# Patient Record
Sex: FEMALE | Race: BLACK OR AFRICAN AMERICAN | Employment: FULL TIME | URBAN - METROPOLITAN AREA
[De-identification: names, ages, dates, MRNs, and addresses within clinical notes are randomized per-mention and may not be internally consistent; named-entity substitution may affect disease eponyms.]

---

## 2017-01-23 ENCOUNTER — HOSPITAL ENCOUNTER (OUTPATIENT)
Dept: MAMMOGRAPHY | Age: 42
Discharge: HOME | End: 2017-01-23
Attending: INTERNAL MEDICINE
Payer: COMMERCIAL

## 2017-01-23 DIAGNOSIS — R92.8 ABNORMAL MAMMOGRAM OF LEFT BREAST: ICD-10-CM

## 2017-01-23 PROCEDURE — 77062 BREAST TOMOSYNTHESIS BI: CPT

## 2017-01-23 NOTE — LETTER
1/26/2017 12:01 PM 
 
Ms. Mak Plan [address] [place] [address] [phone] Dear Aubree Plan: 
 
Please find your most recent results below. Resulted Orders [facility] 3D LINDA W MAMMO BI DX INCL CAD Narrative EXAM:  [facility] 3D LINDA W MAMMO BI DX INCL CAD. INDICATION: 6month followup left. COMPARISON: Prior mammograms 7/7/2016, 7/6/2016, and 11/30/2011. Karen Cherelle COMPOSITION: The breasts are heterogeneously dense, which may obscure small 
masses. TECHNIQUE: Routine CC and MLO views of each breast were performed with digital 
technique and interpreted with use of computer-aided detection. Additionally, 
tomosynthesis of both breasts in the CC and MLO projections was performed. FINDINGS: There is no persistent parenchymal asymmetry in the left breast. 
Benign-appearing small cyst density seen in the upper outer right breast. There 
is no mass, suspicious calcification, distortion, skin thickening, or nipple 
retraction. No significant change from the prior study. Impression IMPRESSION:  No mammographic evidence of malignancy. BI-RADS Assessment Category 2: Benign finding. RECOMMENDATION:  Routine screening mammogram in one year in the absence of new 
or suspicious clinical findings. RECOMMENDATIONS: 
None. Keep up the good work! Please call me if you have any questions: [phone] Sincerely, [facility] 1

## 2017-04-17 ENCOUNTER — HOSPITAL ENCOUNTER (EMERGENCY)
Age: 42
Discharge: STILL ADMITTED OR ADMINISTRATIVE | End: 2017-04-17
Attending: EMERGENCY MEDICINE

## 2017-04-18 ENCOUNTER — OFFICE VISIT (OUTPATIENT)
Dept: INTERNAL MEDICINE CLINIC | Age: 42
End: 2017-04-18

## 2017-04-18 VITALS
OXYGEN SATURATION: 98 % | RESPIRATION RATE: 20 BRPM | BODY MASS INDEX: 37.13 KG/M2 | HEIGHT: 68 IN | HEART RATE: 64 BPM | DIASTOLIC BLOOD PRESSURE: 89 MMHG | SYSTOLIC BLOOD PRESSURE: 132 MMHG | TEMPERATURE: 97.5 F | WEIGHT: 245 LBS

## 2017-04-18 DIAGNOSIS — R51.9 HEADACHE, UNSPECIFIED HEADACHE TYPE: Primary | ICD-10-CM

## 2017-04-18 NOTE — LETTER
4/21/2017 9:11 AM 
 
Ms. Jeyson Cueto [address] [place] [id number] Dear Jeyson Cueto: 
 
Please find your most recent results below. Resulted Orders CBC WITH AUTOMATED DIFF Result Value Ref Range WBC 4.2 3.4 - 10.8 x10E3/uL  
 RBC 4.59 3.77 - 5.28 x10E6/uL HGB 12.3 11.1 - 15.9 g/dL HCT 38.8 34.0 - 46.6 % MCV 85 79 - 97 fL  
 MCH 26.8 26.6 - 33.0 pg  
 MCHC 31.7 31.5 - 35.7 g/dL  
 RDW 17.2 (H) 12.3 - 15.4 % PLATELET 496 978 - 039 x10E3/uL NEUTROPHILS 45 % Lymphocytes 45 % MONOCYTES 7 % EOSINOPHILS 2 % BASOPHILS 1 %  
 ABS. NEUTROPHILS 1.9 1.4 - 7.0 x10E3/uL Abs Lymphocytes 1.9 0.7 - 3.1 x10E3/uL  
 ABS. MONOCYTES 0.3 0.1 - 0.9 x10E3/uL  
 ABS. EOSINOPHILS 0.1 0.0 - 0.4 x10E3/uL  
 ABS. BASOPHILS 0.0 0.0 - 0.2 x10E3/uL IMMATURE GRANULOCYTES 0 %  
 ABS. IMM. GRANS. 0.0 0.0 - 0.1 x10E3/uL Narrative Performed at:  [address]  [id number] : Linder Lanes MD, Phone:  [phone] METABOLIC PANEL, COMPREHENSIVE Result Value Ref Range Glucose 96 65 - 99 mg/dL BUN 11 6 - 24 mg/dL Creatinine 0.85 0.57 - 1.00 mg/dL GFR est non-AA 85 >59 mL/min/1.73 GFR est AA 98 >59 mL/min/1.73  
 BUN/Creatinine ratio 13 9 - 23 Sodium 141 134 - 144 mmol/L Potassium 4.3 3.5 - 5.2 mmol/L Chloride 101 96 - 106 mmol/L  
 CO2 25 18 - 29 mmol/L Calcium 9.2 8.7 - 10.2 mg/dL Protein, total 6.9 6.0 - 8.5 g/dL Albumin 4.1 3.5 - 5.5 g/dL GLOBULIN, TOTAL 2.8 1.5 - 4.5 g/dL A-G Ratio 1.5 1.2 - 2.2 Bilirubin, total 0.5 0.0 - 1.2 mg/dL Alk. phosphatase 64 39 - 117 IU/L  
 AST (SGOT) 20 0 - 40 IU/L  
 ALT (SGPT) 14 0 - 32 IU/L Narrative Performed at:  [address]  [id number] : Linder Lanes MD, Phone:  [phone] TSH 3RD GENERATION Result Value Ref Range TSH 1.190 0.450 - 4.500 uIU/mL Narrative Performed at:  [address]  [id number] : Karin Gallo MD, Phone:  [phone] RECOMMENDATIONS: 
 
  Stable. Please call me if you have any questions: [phone] Sincerely, Merly Clayton NP

## 2017-04-18 NOTE — PATIENT INSTRUCTIONS
Head or Face Pain in Children: Care Instructions  Your Care Instructions  Common causes of head or face pain are allergies, stress, and injuries. Other causes include tooth problems and sinus infections. Eating certain foods, such as chocolate or cheese, or drinking certain liquids, such as cola, can cause head pain for some children. If your child has mild head pain, he or she may not need treatment. It is important to watch your child's symptoms and talk to your doctor if the pain continues or gets worse. Follow-up care is a key part of your child's treatment and safety. Be sure to make and go to all appointments, and call your doctor if your child is having problems. It's also a good idea to know your child's test results and keep a list of the medicines your child takes. How can you care for your child at home? · Be safe with medicines. Give pain medicines exactly as directed. ¨ If the doctor gave your child a prescription medicine for pain, give it as prescribed. ¨ If your child is not taking a prescription pain medicine, ask your doctor if he or she can take an over-the-counter pain medicine. ¨ Do not give aspirin to anyone younger than 20. It has been linked to Reye syndrome, a serious illness. · Have your child take it easy for the next few days or longer if he or she is not feeling well. · Use a warm, moist towel to relax tight muscles in your child's shoulder and neck. Gently massage your child's neck and shoulders. · Put ice or a cold pack on the area for 10 to 20 minutes at a time. Put a thin cloth between the ice and your child's skin. When should you call for help? Call 911 anytime you think your child may need emergency care. For example, call if:  · Your child has twitching, jerking, or a seizure. · Your child passes out (loses consciousness). · Your child has general weakness, including new problems with walking or balance.   · Your child has a sudden, severe headache that is different from past headaches. Call your doctor now or seek immediate medical care if:  · Your child has a fever with a stiff neck or a severe headache. · Your child has nausea and vomiting. · Your child cannot keep food or liquids down. Watch closely for changes in your child's health, and be sure to contact your doctor if:  · Your child's head or face pain does not get better as expected. Where can you learn more? Go to http://gregorio-megan.info/. Enter B980 in the search box to learn more about \"Head or Face Pain in Children: Care Instructions. \"  Current as of: May 27, 2016  Content Version: 11.2  © 6497-4168 [employer]. Care instructions adapted under license by [employer] (which disclaims liability or warranty for this information). If you have questions about a medical condition or this instruction, always ask your healthcare professional. [place] [address] any warranty or liability for your use of this information.

## 2017-04-18 NOTE — MR AVS SNAPSHOT
Visit Information Date & Time Provider Department Dept. Phone Encounter #  
 4/18/2017  8:30 AM Max Gomez, [facility] [phone] [id number] Upcoming Health Maintenance Date Due DTaP/Tdap/Td series (1 - Tdap) 9/4/1996 PAP AKA CERVICAL CYTOLOGY 6/19/2017 Allergies as of 4/18/2017  Review Complete On: 4/18/2017 By: Max Gomez NP No Known Allergies Current Immunizations  Never Reviewed No immunizations on file. Not reviewed this visit You Were Diagnosed With   
  
 Codes Comments Headache, unspecified headache type    -  Primary ICD-10-CM: R51 ICD-9-CM: 903. 0 Vitals BP Pulse Temp Resp Height(growth percentile) Weight(growth percentile) 132/89 64 97.5 °F (36.4 °C) 20 5' 8\" (1.727 m) 245 lb (111.1 kg) LMP SpO2 BMI OB Status Smoking Status 11/16/2016 98% 37.25 kg/m2 Hysterectomy Never Smoker BMI and BSA Data Body Mass Index Body Surface Area  
 37.25 kg/m 2 2.31 m 2 Preferred Pharmacy Pharmacy Name Phone [id number] [place], [address] [facility] [phone] Your Updated Medication List  
  
   
This list is accurate as of: 4/18/17  9:28 AM.  Always use your most recent med list.  
  
  
  
  
 MULTI-VITAMIN PO Take  by mouth. SLOW REL IRON PO Take  by mouth. We Performed the Following CBC WITH AUTOMATED DIFF [47080 CPT(R)] METABOLIC PANEL, COMPREHENSIVE [50304 CPT(R)] TSH 3RD GENERATION [00683 CPT(R)] Patient Instructions Head or Face Pain in Children: Care Instructions Your Care Instructions Common causes of head or face pain are allergies, stress, and injuries. Other causes include tooth problems and sinus infections. Eating certain foods, such as chocolate or cheese, or drinking certain liquids, such as cola, can cause head pain for some children. If your child has mild head pain, he or she may not need treatment. It is important to watch your child's symptoms and talk to your doctor if the pain continues or gets worse. Follow-up care is a key part of your child's treatment and safety. Be sure to make and go to all appointments, and call your doctor if your child is having problems. It's also a good idea to know your child's test results and keep a list of the medicines your child takes. How can you care for your child at home? · Be safe with medicines. Give pain medicines exactly as directed. ¨ If the doctor gave your child a prescription medicine for pain, give it as prescribed. ¨ If your child is not taking a prescription pain medicine, ask your doctor if he or she can take an over-the-counter pain medicine. ¨ Do not give aspirin to anyone younger than 20. It has been linked to Reye syndrome, a serious illness. · Have your child take it easy for the next few days or longer if he or she is not feeling well. · Use a warm, moist towel to relax tight muscles in your child's shoulder and neck. Gently massage your child's neck and shoulders. · Put ice or a cold pack on the area for 10 to 20 minutes at a time. Put a thin cloth between the ice and your child's skin. When should you call for help? Call 911 anytime you think your child may need emergency care. For example, call if: 
· Your child has twitching, jerking, or a seizure. · Your child passes out (loses consciousness). · Your child has general weakness, including new problems with walking or balance. · Your child has a sudden, severe headache that is different from past headaches. Call your doctor now or seek immediate medical care if: 
· Your child has a fever with a stiff neck or a severe headache. · Your child has nausea and vomiting. · Your child cannot keep food or liquids down. Watch closely for changes in your child's health, and be sure to contact your doctor if: · Your child's head or face pain does not get better as expected. Where can you learn more? Go to http://gregorio-megan.info/. Enter U029 in the search box to learn more about \"Head or Face Pain in Children: Care Instructions. \" Current as of: May 27, 2016 Content Version: 11.2 © 6582-6395 [employer]. Care instructions adapted under license by [employer] (which disclaims liability or warranty for this information). If you have questions about a medical condition or this instruction, always ask your healthcare professional. [address] any warranty or liability for your use of this information. Please provide this summary of care documentation to your next provider. Your primary care clinician is listed as Kurt Harden. If you have any questions after today's visit, please call [phone].

## 2017-04-18 NOTE — LETTER
4/21/2017 1:02 PM 
 
Ms. Zena Haney [address] [place] [id number] Dear Zena Haney: 
 
Please find your most recent results below. Resulted Orders CBC WITH AUTOMATED DIFF Result Value Ref Range WBC 4.2 3.4 - 10.8 x10E3/uL  
 RBC 4.59 3.77 - 5.28 x10E6/uL HGB 12.3 11.1 - 15.9 g/dL HCT 38.8 34.0 - 46.6 % MCV 85 79 - 97 fL  
 MCH 26.8 26.6 - 33.0 pg  
 MCHC 31.7 31.5 - 35.7 g/dL  
 RDW 17.2 (H) 12.3 - 15.4 % PLATELET 760 972 - 797 x10E3/uL NEUTROPHILS 45 % Lymphocytes 45 % MONOCYTES 7 % EOSINOPHILS 2 % BASOPHILS 1 %  
 ABS. NEUTROPHILS 1.9 1.4 - 7.0 x10E3/uL Abs Lymphocytes 1.9 0.7 - 3.1 x10E3/uL  
 ABS. MONOCYTES 0.3 0.1 - 0.9 x10E3/uL  
 ABS. EOSINOPHILS 0.1 0.0 - 0.4 x10E3/uL  
 ABS. BASOPHILS 0.0 0.0 - 0.2 x10E3/uL IMMATURE GRANULOCYTES 0 %  
 ABS. IMM. GRANS. 0.0 0.0 - 0.1 x10E3/uL Narrative Performed at:  [address]  [id number] : Kimberley Doherty MD, Phone:  [phone] METABOLIC PANEL, COMPREHENSIVE Result Value Ref Range Glucose 96 65 - 99 mg/dL BUN 11 6 - 24 mg/dL Creatinine 0.85 0.57 - 1.00 mg/dL GFR est non-AA 85 >59 mL/min/1.73 GFR est AA 98 >59 mL/min/1.73  
 BUN/Creatinine ratio 13 9 - 23 Sodium 141 134 - 144 mmol/L Potassium 4.3 3.5 - 5.2 mmol/L Chloride 101 96 - 106 mmol/L  
 CO2 25 18 - 29 mmol/L Calcium 9.2 8.7 - 10.2 mg/dL Protein, total 6.9 6.0 - 8.5 g/dL Albumin 4.1 3.5 - 5.5 g/dL GLOBULIN, TOTAL 2.8 1.5 - 4.5 g/dL A-G Ratio 1.5 1.2 - 2.2 Bilirubin, total 0.5 0.0 - 1.2 mg/dL Alk. phosphatase 64 39 - 117 IU/L  
 AST (SGOT) 20 0 - 40 IU/L  
 ALT (SGPT) 14 0 - 32 IU/L Narrative Performed at:  [address]  [id number] : Kimberley Doherty MD, Phone:  [phone] TSH 3RD GENERATION Result Value Ref Range TSH 1.190 0.450 - 4.500 uIU/mL Narrative Performed at:  [address], [address] [place] [address]  [id number] : Yas Costa MD, Phone:  [phone] RECOMMENDATIONS: 
 
[facility] labs Please call me if you have any questions: [phone] Sincerely, Brendon Zafar, NP

## 2017-04-18 NOTE — PROGRESS NOTES
Chief Complaint   Patient presents with    Headache     H/A with working out--pain scale 10/10     Reviewed record  In preparation for visit and have obtained necessary documentation. 1. Have you been to the ER, urgent care clinic since your last visit? Hospitalized since your last visit? Hysterectomy Nov. 2016.    2. Have you seen or consulted any other health care providers outside of the [facility] since your last visit? Include any pap smears or colon screening.    Dr. Melissa Pemberton    Used 2 patient I. D. 's

## 2017-04-18 NOTE — PROGRESS NOTES
HISTORY OF PRESENT ILLNESS  Gladis Gordon is a 39 y.o. female. This is a patient of Dr. Yandel Huynh who presents today related to headache. The patient states that over the last 2 weeks she has had intermittent experience of headaches. Headaches have occurred 4 times over the last 2 weeks, with activity. The patient describes sharp, severe pain to front of head, which she rates as 10/10 that lasts for about one minute. Patient stops activity and relaxes and states she experiences mild nagging pain to above the right eye for about 1 hour. The patient denies nausea, vomiting, vision change, and speech change. She had no numbness, tingling, or weakness. Patient checked her BP yesterday evening following activity and found top number to be 160, she rechecked letter after rest and BP was 143/99. Visit Vitals    /89    Pulse 64    Temp 97.5 °F (36.4 °C)    Resp 20    Ht 5' 8\" (1.727 m)    Wt 245 lb (111.1 kg)    SpO2 98%    BMI 37.25 kg/m2     HPI    Review of Systems   Constitutional: Negative for chills, fever, malaise/fatigue and weight loss. HENT: Positive for congestion. Negative for ear pain and sore throat. Eyes: Negative for blurred vision. Respiratory: Negative for cough, shortness of breath and wheezing. Cardiovascular: Negative for chest pain, palpitations and leg swelling. Gastrointestinal: Negative for abdominal pain. Genitourinary: Negative. Musculoskeletal: Negative. Skin: Negative. Neurological: Positive for headaches. Negative for dizziness, tingling, tremors and weakness. Endo/Heme/Allergies: Negative. Psychiatric/Behavioral: Negative. Physical Exam   Constitutional: She is oriented to person, place, and time. She appears well-developed and well-nourished. No distress. HENT:   Head: Normocephalic and atraumatic. Right Ear: External ear normal.   Left Ear: External ear normal.   Mouth/Throat: No oropharyngeal exudate.    Right frontal sinus tenderness   Eyes: Conjunctivae are normal.   Neck: Neck supple. Cardiovascular: Normal rate, regular rhythm, normal heart sounds and intact distal pulses. No murmur heard. Pulmonary/Chest: Effort normal and breath sounds normal. No respiratory distress. She has no wheezes. She has no rales. Abdominal: Soft. She exhibits no distension. Musculoskeletal: Normal range of motion. She exhibits no edema. Lymphadenopathy:     She has no cervical adenopathy. Neurological: She is alert and oriented to person, place, and time. No cranial nerve deficit. She exhibits normal muscle tone. Coordination normal.   Skin: Skin is warm and dry. Psychiatric: She has a normal mood and affect. Her behavior is normal.   Nursing note and vitals reviewed. ASSESSMENT and PLAN    ICD-10-CM ICD-9-CM    1. Headache, unspecified headache type R51 784.0 Advised to increase fluid intake, as tolerated. Monitor BP and keep log at home. May take OTC Zyrtec as per packaging instructions daily x 10 days. Recommend eye exam.  Discussed possibility of exertional headache. Will order  CBC WITH AUTOMATED DIFF      METABOLIC PANEL, COMPREHENSIVE      TSH 3RD GENERATION     Lab results and schedule of future lab studies reviewed with patient  Reviewed diet, exercise and weight control  Reviewed medications and side effects in detail  Patient encouraged to call or return to office if symptoms do not improve or worsen. Reviewed plan of care with patient who acknowledges understanding and agrees. If experiencing severe head pain and/or neurological changes such as slurred speech, vision change, or extremity weakness, present to ER. Follow-up in two weeks, or sooner as needed.

## 2017-04-19 LAB
ALBUMIN SERPL-MCNC: 4.1 G/DL (ref 3.5–5.5)
ALBUMIN/GLOB SERPL: 1.5 {RATIO} (ref 1.2–2.2)
ALP SERPL-CCNC: 64 IU/L (ref 39–117)
ALT SERPL-CCNC: 14 IU/L (ref 0–32)
AST SERPL-CCNC: 20 IU/L (ref 0–40)
BASOPHILS # BLD AUTO: 0 X10E3/UL (ref 0–0.2)
BASOPHILS NFR BLD AUTO: 1 %
BILIRUB SERPL-MCNC: 0.5 MG/DL (ref 0–1.2)
BUN SERPL-MCNC: 11 MG/DL (ref 6–24)
BUN/CREAT SERPL: 13 (ref 9–23)
CALCIUM SERPL-MCNC: 9.2 MG/DL (ref 8.7–10.2)
CHLORIDE SERPL-SCNC: 101 MMOL/L (ref 96–106)
CO2 SERPL-SCNC: 25 MMOL/L (ref 18–29)
CREAT SERPL-MCNC: 0.85 MG/DL (ref 0.57–1)
EOSINOPHIL # BLD AUTO: 0.1 X10E3/UL (ref 0–0.4)
EOSINOPHIL NFR BLD AUTO: 2 %
ERYTHROCYTE [DISTWIDTH] IN BLOOD BY AUTOMATED COUNT: 17.2 % (ref 12.3–15.4)
GLOBULIN SER CALC-MCNC: 2.8 G/DL (ref 1.5–4.5)
GLUCOSE SERPL-MCNC: 96 MG/DL (ref 65–99)
HCT VFR BLD AUTO: 38.8 % (ref 34–46.6)
HGB BLD-MCNC: 12.3 G/DL (ref 11.1–15.9)
IMM GRANULOCYTES # BLD: 0 X10E3/UL (ref 0–0.1)
IMM GRANULOCYTES NFR BLD: 0 %
LYMPHOCYTES # BLD AUTO: 1.9 X10E3/UL (ref 0.7–3.1)
LYMPHOCYTES NFR BLD AUTO: 45 %
MCH RBC QN AUTO: 26.8 PG (ref 26.6–33)
MCHC RBC AUTO-ENTMCNC: 31.7 G/DL (ref 31.5–35.7)
MCV RBC AUTO: 85 FL (ref 79–97)
MONOCYTES # BLD AUTO: 0.3 X10E3/UL (ref 0.1–0.9)
MONOCYTES NFR BLD AUTO: 7 %
NEUTROPHILS # BLD AUTO: 1.9 X10E3/UL (ref 1.4–7)
NEUTROPHILS NFR BLD AUTO: 45 %
PLATELET # BLD AUTO: 261 X10E3/UL (ref 150–379)
POTASSIUM SERPL-SCNC: 4.3 MMOL/L (ref 3.5–5.2)
PROT SERPL-MCNC: 6.9 G/DL (ref 6–8.5)
RBC # BLD AUTO: 4.59 X10E6/UL (ref 3.77–5.28)
SODIUM SERPL-SCNC: 141 MMOL/L (ref 134–144)
TSH SERPL DL<=0.005 MIU/L-ACNC: 1.19 UIU/ML (ref 0.45–4.5)
WBC # BLD AUTO: 4.2 X10E3/UL (ref 3.4–10.8)

## 2018-01-19 ENCOUNTER — HOSPITAL ENCOUNTER (OUTPATIENT)
Dept: LAB | Age: 43
Discharge: HOME | End: 2018-01-19

## 2018-01-19 ENCOUNTER — HOSPITAL ENCOUNTER (EMERGENCY)
Age: 43
Discharge: HOME | End: 2018-01-19
Attending: FAMILY MEDICINE

## 2018-01-19 VITALS
OXYGEN SATURATION: 97 % | DIASTOLIC BLOOD PRESSURE: 87 MMHG | RESPIRATION RATE: 18 BRPM | HEIGHT: 68 IN | SYSTOLIC BLOOD PRESSURE: 131 MMHG | BODY MASS INDEX: 37.74 KG/M2 | HEART RATE: 86 BPM | WEIGHT: 249 LBS | TEMPERATURE: 98.5 F

## 2018-01-19 DIAGNOSIS — R68.89 FLU-LIKE SYMPTOMS: Primary | ICD-10-CM

## 2018-01-19 LAB
INFLUENZA A AG (POC): NORMAL
INFLUENZA AG (POC) NEGATIVE CTRL.: NORMAL
INFLUENZA AG (POC) POSITIVE CTRL.: NORMAL
INFLUENZA B AG (POC): NORMAL
LOT NUMBER POC: NORMAL
S PYO AG THROAT QL: NEGATIVE

## 2018-01-19 PROCEDURE — 87070 CULTURE OTHR SPECIMN AEROBIC: CPT | Performed by: PHYSICIAN ASSISTANT

## 2018-01-19 RX ORDER — OSELTAMIVIR PHOSPHATE 75 MG/1
75 CAPSULE ORAL 2 TIMES DAILY
Qty: 10 CAP | Refills: 0 | Status: SHIPPED | OUTPATIENT
Start: 2018-01-19 | End: 2018-01-24

## 2018-01-19 NOTE — UC PROVIDER NOTE
Patient is a 43 y.o. female presenting with cold symptoms. The history is provided by the patient. Cold Symptoms    This is a new problem. Episode onset: 3 days ago. The problem has been gradually worsening. Patient reports a subjective fever - was not measured. Associated symptoms include congestion, rhinorrhea, sore throat, swollen glands and cough. Pertinent negatives include no chest pain, no nausea, no vomiting, no ear pain, no headaches and no wheezing. No past medical history on file. Past Surgical History:   Procedure Laterality Date    ABDOMEN SURGERY PROC UNLISTED      tummytac    HX HYSTERECTOMY  11/16/2016         Family History   Problem Relation Age of Onset    Heart Failure Maternal Grandmother         Social History     Social History    Marital status: SINGLE     Spouse name: N/A    Number of children: N/A    Years of education: N/A     Occupational History    Not on file. Social History Main Topics    Smoking status: Never Smoker    Smokeless tobacco: Never Used    Alcohol use Yes      Comment: occassionally--maybe once or twice per yr.  Drug use: No    Sexual activity: Yes     Partners: Male      Comment:  has 3 children ages 6 yrs, 9rs. & 6 yrs. Other Topics Concern    Not on file     Social History Narrative                ALLERGIES: Review of patient's allergies indicates no known allergies. Review of Systems   Constitutional: Positive for chills and fever. HENT: Positive for congestion, rhinorrhea and sore throat. Negative for ear pain. Respiratory: Positive for cough. Negative for shortness of breath and wheezing. Cardiovascular: Negative for chest pain and palpitations. Gastrointestinal: Negative for nausea and vomiting. Musculoskeletal: Positive for myalgias. Neurological: Negative for headaches.        Vitals:    01/19/18 1700 01/19/18 1756   BP: (!) 148/91 131/87   Pulse: 86    Resp: 18    Temp: 98.5 °F (36.9 °C)    SpO2: 97% Weight: 112.9 kg (249 lb)    Height: 5' 8\" (1.727 m)        Physical Exam   Constitutional: She appears well-developed and well-nourished. No distress. HENT:   Right Ear: Tympanic membrane, external ear and ear canal normal.   Left Ear: Tympanic membrane, external ear and ear canal normal.   Nose: Rhinorrhea present. Right sinus exhibits no maxillary sinus tenderness and no frontal sinus tenderness. Left sinus exhibits no maxillary sinus tenderness and no frontal sinus tenderness. Mouth/Throat: Mucous membranes are normal. Posterior oropharyngeal erythema present. No oropharyngeal exudate, posterior oropharyngeal edema or tonsillar abscesses. Cardiovascular: Normal rate, regular rhythm and normal heart sounds. Pulmonary/Chest: Effort normal and breath sounds normal. No respiratory distress. She has no wheezes. She has no rales. Neurological: She is alert. Skin: She is not diaphoretic. Psychiatric: She has a normal mood and affect. Her behavior is normal. Judgment and thought content normal.   Nursing note and vitals reviewed. MDM     Differential Diagnosis; Clinical Impression; Plan:     CLINICAL IMPRESSION:  Flu-like symptoms  (primary encounter diagnosis)    Plan:  1. Throat Cx  2. Tamiflu  3. Fluids, rest  Amount and/or Complexity of Data Reviewed:   Clinical lab tests:  Ordered and reviewed (RST: negative  Influenza: negative)  Risk of Significant Complications, Morbidity, and/or Mortality:   Presenting problems: Moderate  Diagnostic procedures: Moderate  Management options:   Moderate  Progress:   Patient progress:  Stable      Procedures

## 2018-01-19 NOTE — DISCHARGE INSTRUCTIONS

## 2018-01-21 LAB
BACTERIA SPEC CULT: NORMAL
SERVICE CMNT-IMP: NORMAL

## 2018-09-19 ENCOUNTER — HOSPITAL ENCOUNTER (OUTPATIENT)
Dept: MAMMOGRAPHY | Age: 43
Discharge: HOME | End: 2018-09-19
Attending: INTERNAL MEDICINE
Payer: COMMERCIAL

## 2018-09-19 ENCOUNTER — OFFICE VISIT (OUTPATIENT)
Dept: INTERNAL MEDICINE CLINIC | Age: 43
End: 2018-09-19

## 2018-09-19 VITALS
RESPIRATION RATE: 15 BRPM | HEART RATE: 62 BPM | TEMPERATURE: 97.5 F | DIASTOLIC BLOOD PRESSURE: 88 MMHG | HEIGHT: 68 IN | WEIGHT: 253 LBS | SYSTOLIC BLOOD PRESSURE: 130 MMHG | BODY MASS INDEX: 38.34 KG/M2 | OXYGEN SATURATION: 97 %

## 2018-09-19 DIAGNOSIS — Z12.31 VISIT FOR SCREENING MAMMOGRAM: ICD-10-CM

## 2018-09-19 DIAGNOSIS — Z23 ENCOUNTER FOR IMMUNIZATION: ICD-10-CM

## 2018-09-19 DIAGNOSIS — Z20.2 EXPOSURE TO STD: ICD-10-CM

## 2018-09-19 DIAGNOSIS — E55.9 VITAMIN D DEFICIENCY: ICD-10-CM

## 2018-09-19 DIAGNOSIS — E66.9 OBESITY (BMI 30-39.9): ICD-10-CM

## 2018-09-19 DIAGNOSIS — R73.03 PRE-DIABETES: ICD-10-CM

## 2018-09-19 DIAGNOSIS — Z00.00 ROUTINE GENERAL MEDICAL EXAMINATION AT A HEALTH CARE FACILITY: Primary | ICD-10-CM

## 2018-09-19 PROCEDURE — 77063 BREAST TOMOSYNTHESIS BI: CPT

## 2018-09-19 NOTE — PATIENT INSTRUCTIONS

## 2018-09-19 NOTE — PROGRESS NOTES
HISTORY OF PRESENT ILLNESS Halle Baca is a 37 y.o. female here for follow up. Here for complete physical. 
She has gained more weight. Watching diet and exercise. Doing more weightlifting. She has pre diabetes. watching diet. Her left great toe does not extend a lot. She is to wear high heels. No fall or injury. No chest pain ,or palpitation. Would like to get STD checkup. Need tetanus shot. Just had mammogram.  She she will see GYN soon for Pap smear. Complete Physical  
 
Toe Pain Pertinent negatives include no neck pain. Ear Fullness Pertinent negatives include no neck pain. Follow-up Obesity Review of Systems Constitutional: Negative. Negative for chills and fever. HENT: Negative. Eyes: Negative. Negative for blurred vision and double vision. Respiratory: Negative. Cardiovascular: Negative. Gastrointestinal: Negative. Negative for heartburn and nausea. Genitourinary: Negative. Negative for dysuria and urgency. Musculoskeletal: Negative. Negative for myalgias and neck pain. Skin: Negative. All other systems reviewed and are negative. Physical Exam  
Constitutional: She is oriented to person, place, and time. She appears well-developed and well-nourished. No distress. HENT:  
Head: Normocephalic and atraumatic. Right Ear: External ear normal.  
Left Ear: External ear normal.  
Nose: Nose normal.  
Mouth/Throat: Oropharynx is clear and moist. No oropharyngeal exudate. Eyes: Conjunctivae and EOM are normal. Pupils are equal, round, and reactive to light. Neck: Normal range of motion. Neck supple. No JVD present. No thyromegaly present. Cardiovascular: Normal rate, regular rhythm, normal heart sounds and intact distal pulses. Pulmonary/Chest: Effort normal and breath sounds normal. No respiratory distress. She has no wheezes. She has no rales. Abdominal: Soft. Bowel sounds are normal. She exhibits no distension. There is no tenderness. Musculoskeletal: She exhibits no edema or tenderness. Neurological: She is alert and oriented to person, place, and time. She has normal reflexes. She displays normal reflexes. No cranial nerve deficit. She exhibits normal muscle tone. Coordination normal.  
Psychiatric: She has a normal mood and affect. Her behavior is normal.  
 
 
ASSESSMENT and PLAN Diagnoses and all orders for this visit: 1. Routine general medical examination at a health care facility Seems healthy. Advised to eat healthy and exercise and lose weight. We will check, 
 
-     CBC WITH AUTOMATED DIFF 
-     METABOLIC PANEL, COMPREHENSIVE 
-     LIPID PANEL 
-     HEMOGLOBIN A1C WITH EAG 
-     TSH 3RD GENERATION 
-     URINALYSIS W/ RFLX MICROSCOPIC 2. Pre-diabetes Eating healthier- a Mediterranean style diet with 55% or less of calories from carbohydrates has been shown to be very helpful for people with pre-diabetes. Try to eat more vegetables, whole fruit, nuts, whole grains, yogurt and less refined grains. Eat less red meat. Chicken and fish would be good protein sources. Aim to eat less than 45 grams of carbohydrates per meal. [employer] has a nice review.  
 
-     HEMOGLOBIN A1C WITH EAG 3. Obesity (BMI 30-39. 9) Gained more weight. 1. Consume 3 meals per day, do not skip meals. 2. Chose low fat, portion controlled foods for snack and desserts such as low fat chocolate pudding, low fat flavored yogurt, 100 calorie snack packs, etc.  
3. Increase moderate intensity exercise to 30 minutes at least 5 times per week. 4. Read nutrition facts labels to identify foods that are lean, extra lean and low fat The patient is asked to make an attempt to improve diet and exercise patterns to aid in medical management of this problem. -     HEMOGLOBIN A1C WITH EAG 4. Encounter for immunization We will give, 
 
-     TETANUS, DIPHTHERIA TOXOIDS AND ACELLULAR PERTUSSIS VACCINE (TDAP), IN INDIVIDS. >=7, IM 
 
5. Exposure to STD 
-     HIV 2 AB W/REFL IB 
-     HSV TYPE 2-SPECIFIC ABS, IGG W/REFL SUPPLEMENTAL TESTING 
-     RPR 
-     HEPATITIS C AB 
-     HEP B SURFACE AG 
 
6. Vitamin D deficiency 
-     VITAMIN D, 25 HYDROXY Discussed expected course/resolution/complications of diagnosis in detail with patient. Medication risks/benefits/costs/interactions/alternatives discussed with patient. Pt was given an after visit summary which includes diagnoses, current medications & vitals. Pt expressed understanding with the diagnosis and plan.

## 2018-09-19 NOTE — MR AVS SNAPSHOT
[address] [facility] 102 1400 [address] [place] [address] 
[phone] Patient: Uche Faust MRN: [id number] UZS:[birth year]4 Visit Information Date & Time Provider Department Dept. Phone Encounter #  
 9/19/2018  9:30 AM Isha Melendez MD [facility] Internal Medicine [phone] [id number] Upcoming Health Maintenance Date Due DTaP/Tdap/Td series (1 - Tdap) 9/4/1996 PAP AKA CERVICAL CYTOLOGY 6/19/2017 Influenza Age 5 to Adult 4/1/2019* *Topic was postponed. The date shown is not the original due date. Allergies as of 9/19/2018  Review Complete On: 9/19/2018 By: Isha Melendez MD  
 No Known Allergies Current Immunizations  Reviewed on 9/19/2018 Name Date Tdap  Incomplete Reviewed by Isha Melendez MD on 9/19/2018 at  9:52 AM  
You Were Diagnosed With   
  
 Codes Comments Routine general medical examination at a health care facility    -  Primary ICD-10-CM: Z00.00 ICD-9-CM: V70.0 Pre-diabetes     ICD-10-CM: R73.03 
ICD-9-CM: 790.29 Obesity (BMI 30-39. 9)     ICD-10-CM: E66.9 ICD-9-CM: 278.00 Encounter for immunization     ICD-10-CM: R30 ICD-9-CM: V03.89 Exposure to STD     ICD-10-CM: Z20.2 ICD-9-CM: V01.6 Vitamin D deficiency     ICD-10-CM: E55.9 ICD-9-CM: 268.9 Vitals BP Pulse Temp Resp Height(growth percentile) Weight(growth percentile) 130/88 (BP 1 Location: Right arm, BP Patient Position: Sitting) 62 97.5 °F (36.4 °C) (Oral) 15 5' 8\" (1.727 m) 253 lb (114.8 kg) LMP SpO2 BMI OB Status Smoking Status 11/16/2016 97% 38.47 kg/m2 Hysterectomy Never Smoker Vitals History BMI and BSA Data Body Mass Index Body Surface Area  
 38.47 kg/m 2 2.35 m 2 Preferred Pharmacy Pharmacy Name Phone [facility] [address] [place] [address] [address]  [facility] [phone] Your Updated Medication List  
  
   
 This list is accurate as of 9/19/18  9:55 AM.  Always use your most recent med list.  
  
  
  
  
 MULTI-VITAMIN PO Take  by mouth. We Performed the Following CBC WITH AUTOMATED DIFF [61733 CPT(R)] HEMOGLOBIN A1C WITH EAG [96328 CPT(R)] HEP B SURFACE AG [id number] CPT(R)] HEPATITIS C AB [31966 CPT(R)] HIV 2 AB W/REFL IB [17688 CPT(R)] HSV TYPE 2-SPECIFIC ABS, IGG W/REFL SUPPLEMENTAL TESTING [63438 CPT(R)] LIPID PANEL [75460 CPT(R)] METABOLIC PANEL, COMPREHENSIVE [29298 CPT(R)] RPR [54939 CPT(R)] TETANUS, DIPHTHERIA TOXOIDS AND ACELLULAR PERTUSSIS VACCINE (TDAP), IN INDIVIDS. >=7, IM [id number] CPT(R)] TSH 3RD GENERATION [71612 CPT(R)] URINALYSIS W/ RFLX MICROSCOPIC [20070 CPT(R)] VITAMIN D, 25 HYDROXY [id number] CPT(R)] Patient Instructions Prediabetes: Care Instructions Your Care Instructions Prediabetes is a warning sign that you are at risk for getting type 2 diabetes. It means that your blood sugar is higher than it should be. The food you eat turns into sugar, which your body uses for energy. Normally, an organ called the pancreas makes insulin, which allows the sugar in your blood to get into your body's cells. But when your body can't use insulin the right way, the sugar doesn't move into cells. It stays in your blood instead. This is called insulin resistance. The buildup of sugar in the blood causes prediabetes. The good news is that lifestyle changes may help you get your blood sugar back to normal and help you avoid or delay diabetes. Follow-up care is a key part of your treatment and safety. Be sure to make and go to all appointments, and call your doctor if you are having problems. It's also a good idea to know your test results and keep a list of the medicines you take. How can you care for yourself at home? · Watch your weight. A healthy weight helps your body use insulin properly. · Limit the amount of calories, sweets, and unhealthy fat you eat. Ask your doctor if you should see a dietitian. A registered dietitian can help you create meal plans that fit your lifestyle. · Get at least 30 minutes of exercise on most days of the week. Exercise helps control your blood sugar. It also helps you maintain a healthy weight. Walking is a good choice. You also may want to do other activities, such as running, swimming, cycling, or playing tennis or team sports. · Do not smoke. Smoking can make prediabetes worse. If you need help quitting, talk to your doctor about stop-smoking programs and medicines. These can increase your chances of quitting for good. · If your doctor prescribed medicines, take them exactly as prescribed. Call your doctor if you think you are having a problem with your medicine. You will get more details on the specific medicines your doctor prescribes. When should you call for help? Watch closely for changes in your health, and be sure to contact your doctor if: 
  · You have any symptoms of diabetes. These may include: ¨ Being thirsty more often. ¨ Urinating more. ¨ Being hungrier. ¨ Losing weight. ¨ Being very tired. ¨ Having blurry vision.  
  · You have a wound that will not heal.  
  · You have an infection that will not go away.  
  · You have problems with your blood pressure.  
  · You want more information about diabetes and how you can keep from getting it. Where can you learn more? Go to http://gregorio-megan.info/. Enter I222 in the search box to learn more about \"Prediabetes: Care Instructions. \" Current as of: December 7, 2017 Content Version: 11.7 © 2933-2653 Healthwise, Incorporated. Care instructions adapted under license by [employer] (which disclaims liability or warranty for this information).  If you have questions about a medical condition or this instruction, always ask your healthcare professional. S[employer]na Lan[employer] disclaims any warranty or liability for your use of this information. Introducing [facility]! [employer] introduces Aplicor patient portal. Now you can access parts of your medical record, email your doctor's office, and request medication refills online. 1. In your internet browser, go to [email or web address] 2. Click on the First Time User? Click Here link in the Sign In box. You will see the New Member Sign Up page. 3. Enter your Aplicor Access Code exactly as it appears below. You will not need to use this code after youve completed the sign-up process. If you do not sign up before the expiration date, you must request a new code. · Aplicor Access Code: [email or web address] Expires: 11/19/2018  3:54 PM 
 
4. Enter the last four digits of your Social Security Number (xxxx) and Date of Birth (mm/dd/yyyy) as indicated and click Submit. You will be taken to the next sign-up page. 5. Create a Aplicor ID. This will be your Aplicor login ID and cannot be changed, so think of one that is secure and easy to remember. 6. Create a Aplicor password. You can change your password at any time. 7. Enter your Password Reset Question and Answer. This can be used at a later time if you forget your password. 8. Enter your e-mail address. You will receive e-mail notification when new information is available in [address] 9. Click Sign Up. You can now view and download portions of your medical record. 10. Click the Download Summary menu link to download a portable copy of your medical information. If you have questions, please visit the Frequently Asked Questions section of the Aplicor website. Remember, Aplicor is NOT to be used for urgent needs. For medical emergencies, dial 911. Now available from your iPhone and Android! Please provide this summary of care documentation to your next provider. Your primary care clinician is listed as Christa Lipscomb. If you have any questions after today's visit, please call [phone].

## 2018-09-19 NOTE — PROGRESS NOTES
Health Maintenance Due Topic Date Due  
 DTaP/Tdap/Td series (1 - Tdap) 09/04/1996  PAP AKA CERVICAL CYTOLOGY  06/19/2017  Influenza Age 5 to Adult  08/01/2018 Chief Complaint Patient presents with  Complete Physical  
 
 
1. Have you been to the ER, urgent care clinic since your last visit? Hospitalized since your last visit? No 
 
2. Have you seen or consulted any other health care providers outside of the [address] [place] [address] since your last visit? Include any pap smears or colon screening. No 
 
3) Do you have an Advance Directive on file? no 
 
4) Are you interested in receiving information on Advance Directives? NO Patient is accompanied by self I have received verbal consent from Halle Baca to discuss any/all medical information while they are present in the room. Halle Baca is a 37 y.o. female  who presents for routine immunizations. She denies any symptoms , reactions or allergies that would exclude them from being immunized today. Risks and adverse reactions were discussed and the VIS was given to them. All questions were addressed. She was observed for 10 min post injection. There were no reactions observed.  
 
Mariel Fisher LPN

## 2018-09-20 NOTE — PROGRESS NOTES
Called the pt and advised her that her mammogram is WNL and to follow up as directed. The pt would like a copy of her mammogram results mailed to her.

## 2018-09-24 LAB
25(OH)D3+25(OH)D2 SERPL-MCNC: 14 NG/ML (ref 30–100)
ALBUMIN SERPL-MCNC: 4.4 G/DL (ref 3.5–5.5)
ALBUMIN/GLOB SERPL: 1.5 {RATIO} (ref 1.2–2.2)
ALP SERPL-CCNC: 74 IU/L (ref 39–117)
ALT SERPL-CCNC: 16 IU/L (ref 0–32)
APPEARANCE UR: CLEAR
AST SERPL-CCNC: 20 IU/L (ref 0–40)
BASOPHILS # BLD AUTO: 0 X10E3/UL (ref 0–0.2)
BASOPHILS NFR BLD AUTO: 1 %
BILIRUB SERPL-MCNC: 0.6 MG/DL (ref 0–1.2)
BILIRUB UR QL STRIP: NEGATIVE
BUN SERPL-MCNC: 8 MG/DL (ref 6–24)
BUN/CREAT SERPL: 10 (ref 9–23)
CALCIUM SERPL-MCNC: 9.4 MG/DL (ref 8.7–10.2)
CHLORIDE SERPL-SCNC: 103 MMOL/L (ref 96–106)
CHOLEST SERPL-MCNC: 194 MG/DL (ref 100–199)
CO2 SERPL-SCNC: 26 MMOL/L (ref 20–29)
COLOR UR: YELLOW
CREAT SERPL-MCNC: 0.84 MG/DL (ref 0.57–1)
EOSINOPHIL # BLD AUTO: 0.1 X10E3/UL (ref 0–0.4)
EOSINOPHIL NFR BLD AUTO: 1 %
ERYTHROCYTE [DISTWIDTH] IN BLOOD BY AUTOMATED COUNT: 13.9 % (ref 12.3–15.4)
EST. AVERAGE GLUCOSE BLD GHB EST-MCNC: 123 MG/DL
GLOBULIN SER CALC-MCNC: 2.9 G/DL (ref 1.5–4.5)
GLUCOSE SERPL-MCNC: 96 MG/DL (ref 65–99)
GLUCOSE UR QL: NEGATIVE
HBA1C MFR BLD: 5.9 % (ref 4.8–5.6)
HBV SURFACE AG SERPL QL IA: NEGATIVE
HCT VFR BLD AUTO: 39 % (ref 34–46.6)
HCV AB S/CO SERPL IA: <0.1 S/CO RATIO (ref 0–0.9)
HDLC SERPL-MCNC: 52 MG/DL
HGB BLD-MCNC: 12.6 G/DL (ref 11.1–15.9)
HGB UR QL STRIP: NEGATIVE
HIV 2 AB SERPL QL IA: NEGATIVE
HSV2 IGG SER IA-ACNC: >23.6 INDEX (ref 0–0.9)
IMM GRANULOCYTES # BLD: 0 X10E3/UL (ref 0–0.1)
IMM GRANULOCYTES NFR BLD: 0 %
INTERPRETATION, 910389: NORMAL
KETONES UR QL STRIP: NEGATIVE
LDLC SERPL CALC-MCNC: 130 MG/DL (ref 0–99)
LEUKOCYTE ESTERASE UR QL STRIP: NEGATIVE
LYMPHOCYTES # BLD AUTO: 2.2 X10E3/UL (ref 0.7–3.1)
LYMPHOCYTES NFR BLD AUTO: 51 %
MCH RBC QN AUTO: 28.4 PG (ref 26.6–33)
MCHC RBC AUTO-ENTMCNC: 32.3 G/DL (ref 31.5–35.7)
MCV RBC AUTO: 88 FL (ref 79–97)
MICRO URNS: NORMAL
MONOCYTES # BLD AUTO: 0.2 X10E3/UL (ref 0.1–0.9)
MONOCYTES NFR BLD AUTO: 6 %
NEUTROPHILS # BLD AUTO: 1.7 X10E3/UL (ref 1.4–7)
NEUTROPHILS NFR BLD AUTO: 41 %
NITRITE UR QL STRIP: NEGATIVE
PH UR STRIP: 6.5 [PH] (ref 5–7.5)
PLATELET # BLD AUTO: 300 X10E3/UL (ref 150–379)
POTASSIUM SERPL-SCNC: 4.4 MMOL/L (ref 3.5–5.2)
PROT SERPL-MCNC: 7.3 G/DL (ref 6–8.5)
PROT UR QL STRIP: NEGATIVE
RBC # BLD AUTO: 4.43 X10E6/UL (ref 3.77–5.28)
RPR SER QL: NON REACTIVE
SODIUM SERPL-SCNC: 139 MMOL/L (ref 134–144)
SP GR UR: 1.01 (ref 1–1.03)
TRIGL SERPL-MCNC: 61 MG/DL (ref 0–149)
TSH SERPL DL<=0.005 MIU/L-ACNC: 1.05 UIU/ML (ref 0.45–4.5)
UROBILINOGEN UR STRIP-MCNC: 0.2 MG/DL (ref 0.2–1)
VLDLC SERPL CALC-MCNC: 12 MG/DL (ref 5–40)
WBC # BLD AUTO: 4.2 X10E3/UL (ref 3.4–10.8)

## 2018-09-26 DIAGNOSIS — E55.9 VITAMIN D DEFICIENCY: Primary | ICD-10-CM

## 2018-09-26 RX ORDER — ERGOCALCIFEROL 1.25 MG/1
50000 CAPSULE ORAL
Qty: 4 CAP | Refills: 3 | Status: SHIPPED | OUTPATIENT
Start: 2018-09-26 | End: 2018-12-04 | Stop reason: SDUPTHER

## 2018-09-26 NOTE — PROGRESS NOTES
vit D level very low.will start on vit D 50,000 unit 1 cap weekly for 4 months. will repeat level in 4 months. adv to be on milk product and expose to sun for 20 min a day. Pravin De La Rosa Patient had genital herpes in the past.  She may take Valtrex to prevent spreading of herpes. A1c has improved. Need to watch a low-fat diet and exercise.

## 2018-10-26 NOTE — PROGRESS NOTES
Called the pt and advised her of her lab results and recommendations. The pt voiced thanks and understanding.

## 2019-10-07 ENCOUNTER — OFFICE VISIT (OUTPATIENT)
Dept: INTERNAL MEDICINE CLINIC | Age: 44
End: 2019-10-07

## 2019-10-07 ENCOUNTER — HOSPITAL ENCOUNTER (OUTPATIENT)
Dept: MAMMOGRAPHY | Age: 44
Discharge: HOME | End: 2019-10-07
Attending: INTERNAL MEDICINE
Payer: COMMERCIAL

## 2019-10-07 VITALS
DIASTOLIC BLOOD PRESSURE: 84 MMHG | OXYGEN SATURATION: 97 % | WEIGHT: 261 LBS | RESPIRATION RATE: 15 BRPM | BODY MASS INDEX: 39.56 KG/M2 | SYSTOLIC BLOOD PRESSURE: 132 MMHG | HEART RATE: 77 BPM | HEIGHT: 68 IN | TEMPERATURE: 98.4 F

## 2019-10-07 DIAGNOSIS — E55.9 VITAMIN D DEFICIENCY: ICD-10-CM

## 2019-10-07 DIAGNOSIS — E66.9 OBESITY (BMI 30-39.9): ICD-10-CM

## 2019-10-07 DIAGNOSIS — Z12.31 ENCOUNTER FOR SCREENING MAMMOGRAM FOR MALIGNANT NEOPLASM OF BREAST: ICD-10-CM

## 2019-10-07 DIAGNOSIS — R73.03 PRE-DIABETES: ICD-10-CM

## 2019-10-07 DIAGNOSIS — Z00.00 ROUTINE GENERAL MEDICAL EXAMINATION AT A HEALTH CARE FACILITY: Primary | ICD-10-CM

## 2019-10-07 PROCEDURE — 77063 BREAST TOMOSYNTHESIS BI: CPT

## 2019-10-07 NOTE — LETTER
10/7/2019 4:33 PM 
 
Ms. Jade Swenson [address] [facility] 
[place] [id number] Dear Nicholas Ruvalcaba: 
 
Please find your most recent results below. Resulted Orders KYRA 3D LINDA W MAMMO BI SCREENING INCL CAD (Exam End: 10/7/2019  9:54 AM) Narrative STUDY: Bilateral digital screening mammogram with 3-D tomosynthesis INDICATION:  Screening. COMPARISON: [id number], 2011 BREAST COMPOSITION: The breasts are heterogeneously dense, which may obscure 
small masses. FINDINGS: Bilateral digital screening mammography was performed and is 
interpreted in conjunction with a computer assisted detection (CAD) system. Additionally, tomosynthesis of both breasts in the CC and MLO projections was 
performed. No suspicious masses or calcifications are identified. There has been 
no significant change. Impression IMPRESSION: 
BI-RADS 1: Negative. No mammographic evidence of malignancy. RECOMMENDATIONS: 
Next screening mammogram is recommended in one year. The patient will be notified of these results. RECOMMENDATIONS: 
Your mammogram is normal. Repeat this test in 1 year and follow up with Dr. Irene Mace as directed. Please call me if you have any questions: [phone] Sincerely, 
 
 
Sadiq Huber

## 2019-10-07 NOTE — PROGRESS NOTES
HISTORY OF PRESENT ILLNESS  Xuan Quinn is a 40 y.o. female here for follow up. Here for complete physical.  She has pre diabetes. watching diet. Report pain in both heel since she was wearing a lot of flat sandals during summer. She is obese, has gained more weight since that she went to cruise. Trying to watch diet taking. No chest pain ,or palpitation. Mammogram done, was normal.  She has had a well woman visit. Dr. Mello Concepcion had already ordered a lot of lab work. Complete Physical     Follow-up     Obesity     Anemia     Blood sugar problem     Foot Pain         Review of Systems   Constitutional: Negative. Negative for chills and fever. HENT: Negative. Eyes: Negative. Negative for blurred vision and double vision. Respiratory: Negative. Cardiovascular: Negative. Gastrointestinal: Negative. Negative for heartburn and nausea. Genitourinary: Negative. Negative for dysuria and urgency. Musculoskeletal: Positive for joint pain. Negative for myalgias and neck pain. Skin: Negative. Neurological: Negative. Endo/Heme/Allergies: Negative. Psychiatric/Behavioral: Negative. All other systems reviewed and are negative. Physical Exam   Constitutional: She is oriented to person, place, and time. She appears well-developed and well-nourished. No distress. HENT:   Head: Normocephalic and atraumatic. Right Ear: External ear normal.   Left Ear: External ear normal.   Nose: Nose normal.   Mouth/Throat: Oropharynx is clear and moist. No oropharyngeal exudate. Eyes: Pupils are equal, round, and reactive to light. Conjunctivae and EOM are normal.   Neck: Normal range of motion. Neck supple. No JVD present. No thyromegaly present. Cardiovascular: Normal rate, regular rhythm, normal heart sounds and intact distal pulses. Pulmonary/Chest: Effort normal and breath sounds normal. No respiratory distress. She has no wheezes. She has no rales. Abdominal: Soft.  Bowel sounds are normal. She exhibits no distension. There is no tenderness. Musculoskeletal: She exhibits tenderness. She exhibits no edema. Mild tenderness on heel   Neurological: She is alert and oriented to person, place, and time. She has normal reflexes. No cranial nerve deficit. She exhibits normal muscle tone. Coordination normal.   Psychiatric: She has a normal mood and affect. Her behavior is normal.       ASSESSMENT and PLAN    Diagnoses and all orders for this visit:    1. Routine general medical examination at a health care facility    Seems healthy. Advised to eat healthy, exercise and try to lose weight. Will check,  -     CBC WITH AUTOMATED DIFF  -     METABOLIC PANEL, COMPREHENSIVE  -     LIPID PANEL  -     HEMOGLOBIN A1C WITH EAG  -     URINALYSIS W/ RFLX MICROSCOPIC  -     TSH 3RD GENERATION    2. Vitamin D deficiency    Will check,  -     VITAMIN D, 25 HYDROXY    3. Obesity (BMI 30-39. 9)    Addressed weight, diet and exercise with patient. Decrease carbohydrates (white foods, sweet foods, sweet drinks and alcohol), increase green leafy vegetables and protein (lean meats and beans) with each meal. Avoid fried foods. Eat 3-5 small meals daily. Do not skip meals. Increase water intake. Increase physical activity to 30 minutes daily for health benefit or 60 minutes daily to prevent weight regain, as tolerated. Get 7-8 hours uninterrupted sleep nightly.    -     HEMOGLOBIN A1C WITH EAG    4. Pre-diabetes    Low-carb diet. Will check,  -     HEMOGLOBIN A1C WITH EAG      Discussed expected course/resolution/complications of diagnosis in detail with patient. Medication risks/benefits/costs/interactions/alternatives discussed with patient. Pt was given an after visit summary which includes diagnoses, current medications & vitals. Pt expressed understanding with the diagnosis and plan.

## 2019-10-07 NOTE — PROGRESS NOTES
Health Maintenance Due   Topic Date Due    PAP AKA CERVICAL CYTOLOGY  06/19/2017    Influenza Age 5 to Adult  08/01/2019       Chief Complaint   Patient presents with    Complete Physical    Anemia    Blood sugar problem     Pre-diabetes       1. Have you been to the ER, urgent care clinic since your last visit? Hospitalized since your last visit? No    2. Have you seen or consulted any other health care providers outside of the [address] [place] [address] since your last visit? Include any pap smears or colon screening. No    3) Do you have an Advance Directive on file? no    4) Are you interested in receiving information on Advance Directives? NO      Patient is accompanied by self I have received verbal consent from Chandrakant Fernandez to discuss any/all medical information while they are present in the room.

## 2020-06-26 ENCOUNTER — OFFICE VISIT (OUTPATIENT)
Dept: URGENT CARE | Age: 45
End: 2020-06-26

## 2020-06-26 VITALS — OXYGEN SATURATION: 96 % | TEMPERATURE: 98.4 F | RESPIRATION RATE: 16 BRPM | HEART RATE: 76 BPM

## 2020-06-26 DIAGNOSIS — Z20.822 SUSPECTED COVID-19 VIRUS INFECTION: Primary | ICD-10-CM

## 2020-06-27 NOTE — PROGRESS NOTES
This patient was seen in Flu Clinic at [address] [place] [address] Urgent Care while in their vehicle due to COVID-19 pandemic with PPE and focused examination in order to decrease community viral transmission. The patient/guardian gave verbal consent to treat. The history is provided by the patient. No symptoms  Contact with 1year old Toddler at home      Past Medical History:   Diagnosis Date    Anemia     past, but not since hysterectomy        Past Surgical History:   Procedure Laterality Date    ABDOMEN SURGERY PROC UNLISTED      [email or web address]    HX HYSTERECTOMY  11/16/2016         Family History   Problem Relation Age of Onset    No Known Problems Mother     No Known Problems Father     No Known Problems Sister     No Known Problems Brother     Heart Failure Maternal Grandmother     No Known Problems Maternal Grandfather     No Known Problems Sister     No Known Problems Brother         Social History     Socioeconomic History    Marital status: SINGLE     Spouse name: Not on file    Number of children: Not on file    Years of education: Not on file    Highest education level: Not on file   Occupational History    Not on file   Social Needs    Financial resource strain: Not on file    Food insecurity     Worry: Not on file     Inability: Not on file    Transportation needs     Medical: Not on file     Non-medical: Not on file   Tobacco Use    Smoking status: Never Smoker    Smokeless tobacco: Never Used   Substance and Sexual Activity    Alcohol use: Yes     Comment: occassionally--maybe once or twice per yr.  Drug use: No    Sexual activity: Yes     Partners: Male     Comment:  has 3 children ages 6 yrs, 9rs. & 6 yrs.    Lifestyle    Physical activity     Days per week: Not on file     Minutes per session: Not on file    Stress: Not on file   Relationships    Social connections     Talks on phone: Not on file     Gets together: Not on file     Attends Druze service: Not on file     Active member of club or organization: Not on file     Attends meetings of clubs or organizations: Not on file     Relationship status: Not on file    Intimate partner violence     Fear of current or ex partner: Not on file     Emotionally abused: Not on file     Physically abused: Not on file     Forced sexual activity: Not on file   Other Topics Concern    Not on file   Social History Narrative    Not on file                ALLERGIES: Patient has no known allergies. Review of Systems   All other systems reviewed and are negative. Vitals:    06/26/20 1651   Pulse: 76   Resp: 16   Temp: 98.4 °F (36.9 °C)   SpO2: 96%       Physical Exam  Vitals signs and nursing note reviewed. Constitutional:       General: She is not in acute distress. Appearance: She is not ill-appearing. Pulmonary:      Effort: Pulmonary effort is normal. No respiratory distress. MDM    Procedures        ICD-10-CM ICD-9-CM    1. Suspected COVID-19 virus infection Z20.828 V01.79 NOVEL CORONAVIRUS (COVID-19)         COVID- 19 TEST DONE  No orders of the defined types were placed in this encounter. No results found for any visits on 06/26/20. The patients condition was discussed with the patient and they understand. The patient is to follow up with primary care doctor. If signs and symptoms become worse the pt is to go to the ER. The patient is to take medications as prescribed.

## 2020-07-03 LAB — SARS-COV-2, NAA: NOT DETECTED

## 2020-09-13 LAB — SARS-COV-2, NAA: DETECTED

## 2020-09-15 PROBLEM — R06.03 RESPIRATORY DISTRESS: Status: ACTIVE | Noted: 2020-09-15

## 2020-09-15 PROBLEM — U07.1 INFECTION DUE TO SEVERE ACUTE RESPIRATORY SYNDROME CORONAVIRUS 2 (SARS-COV-2): Status: ACTIVE | Noted: 2020-09-15

## 2020-09-15 PROBLEM — J40 BRONCHITIS: Status: ACTIVE | Noted: 2020-09-15

## 2020-09-15 PROBLEM — B34.9 VIRAL INFECTION: Status: ACTIVE | Noted: 2020-09-15

## 2020-09-15 PROBLEM — Z20.822 SUSPECTED SEVERE ACUTE RESPIRATORY SYNDROME CORONAVIRUS 2 (SARS-COV-2) INFECTION: Status: ACTIVE | Noted: 2020-09-15

## 2020-10-28 ENCOUNTER — OFFICE VISIT (OUTPATIENT)
Dept: URGENT CARE | Age: 45
End: 2020-10-28
Payer: COMMERCIAL

## 2020-10-28 VITALS — RESPIRATION RATE: 16 BRPM | TEMPERATURE: 98.9 F | HEART RATE: 90 BPM | OXYGEN SATURATION: 96 %

## 2020-10-28 DIAGNOSIS — J03.90 ACUTE TONSILLITIS, UNSPECIFIED ETIOLOGY: ICD-10-CM

## 2020-10-28 DIAGNOSIS — U07.1 COVID-19: Primary | ICD-10-CM

## 2020-10-28 LAB
S PYO AG THROAT QL: POSITIVE
VALID INTERNAL CONTROL?: YES

## 2020-10-28 PROCEDURE — 87880 STREP A ASSAY W/OPTIC: CPT | Performed by: FAMILY MEDICINE

## 2020-10-28 PROCEDURE — 99214 OFFICE O/P EST MOD 30 MIN: CPT | Performed by: FAMILY MEDICINE

## 2020-10-28 RX ORDER — AMOXICILLIN 875 MG/1
875 TABLET, FILM COATED ORAL 2 TIMES DAILY
Qty: 20 TAB | Refills: 0 | Status: SHIPPED | OUTPATIENT
Start: 2020-10-28 | End: 2020-11-07

## 2020-10-28 NOTE — PROGRESS NOTES
This patient was seen in [facility] at [address] [place] [address] Urgent Care while in their vehicle due to COVID-19 pandemic with PPE and focused examination in order to decrease community viral transmission. The patient/guardian gave verbal consent to treat. The history is provided by the patient. Sore Throat    The history is provided by the patient. This is a new problem. The current episode started yesterday. The problem has been rapidly worsening. There has been no fever. Associated symptoms include swollen glands and trouble swallowing. She has tried nothing for the symptoms. H/o covid in 9/15/20- want to repeat now  No covid sxs    Past Medical History:   Diagnosis Date    Anemia     past, but not since hysterectomy        Past Surgical History:   Procedure Laterality Date    ABDOMEN SURGERY PROC UNLISTED      [facility]    HX HYSTERECTOMY  11/16/2016         Family History   Problem Relation Age of Onset    No Known Problems Mother     No Known Problems Father     No Known Problems Sister     No Known Problems Brother     Heart Failure Maternal Grandmother     No Known Problems Maternal Grandfather     No Known Problems Sister     No Known Problems Brother         Social History     Socioeconomic History    Marital status: SINGLE     Spouse name: Not on file    Number of children: Not on file    Years of education: Not on file    Highest education level: Not on file   Occupational History    Not on file   Social Needs    Financial resource strain: Not on file    Food insecurity     Worry: Not on file     Inability: Not on file    Transportation needs     Medical: Not on file     Non-medical: Not on file   Tobacco Use    Smoking status: Never Smoker    Smokeless tobacco: Never Used   Substance and Sexual Activity    Alcohol use: Yes     Comment: occassionally--maybe once or twice per yr.     Drug use: No    Sexual activity: Yes     Partners: Male     Comment:  has 3 children ages 6 yrs, 9rs. & 6 yrs. Lifestyle    Physical activity     Days per week: Not on file     Minutes per session: Not on file    Stress: Not on file   Relationships    Social connections     Talks on phone: Not on file     Gets together: Not on file     Attends [employer] service: Not on file     Active member of club or organization: Not on file     Attends meetings of clubs or organizations: Not on file     Relationship status: Not on file    Intimate partner violence     Fear of current or ex partner: Not on file     Emotionally abused: Not on file     Physically abused: Not on file     Forced sexual activity: Not on file   Other Topics Concern    Not on file   Social History Narrative    Not on file                ALLERGIES: Patient has no known allergies. Review of Systems   HENT: Positive for sore throat and trouble swallowing. All other systems reviewed and are negative. Vitals:    10/28/20 1720   Pulse: 90   Resp: 16   Temp: 98.9 °F (37.2 °C)   SpO2: 96%       Physical Exam  Vitals signs and nursing note reviewed. Constitutional:       General: She is not in acute distress. HENT:      Left Ear: Tympanic membrane normal.      Nose: Nose normal.      Mouth/Throat:      Pharynx: Pharyngeal swelling, posterior oropharyngeal erythema and uvula swelling present. No oropharyngeal exudate. Tonsils: Tonsillar exudate present. No tonsillar abscesses. 2+ on the right. Neck:      Musculoskeletal: Neck supple. Pulmonary:      Effort: Pulmonary effort is normal.      Breath sounds: Normal breath sounds. No decreased breath sounds. Lymphadenopathy:      Cervical: No cervical adenopathy. MDM    Procedures        ICD-10-CM ICD-9-CM    1. COVID-19  U07.1 079.89 NOVEL CORONAVIRUS (COVID-19)   2.  Acute tonsillitis, unspecified etiology  J03.90 463 AMB POC RAPID STREP A       Medications Ordered Today   Medications    amoxicillin (AMOXIL) 875 mg tablet     Sig: Take 1 Tab by mouth two (2) times a day for 10 days. Dispense:  20 Tab     Refill:  0     Results for orders placed or performed in visit on 10/28/20   AMB POC RAPID STREP A   Result Value Ref Range    VALID INTERNAL CONTROL POC Yes     Group A Strep Ag Positive Negative     The patients condition was discussed with the patient and they understand. The patient is to follow up with primary care doctor. If signs and symptoms become worse the pt is to go to the ER. The patient is to take medications as prescribed.

## 2020-10-31 LAB — SARS-COV-2, NAA: NOT DETECTED

## 2021-01-28 ENCOUNTER — TRANSCRIBE ORDER (OUTPATIENT)
Dept: SCHEDULING | Age: 46
End: 2021-01-28

## 2021-01-28 DIAGNOSIS — Z12.31 VISIT FOR SCREENING MAMMOGRAM: Primary | ICD-10-CM

## 2021-02-04 ENCOUNTER — OFFICE VISIT (OUTPATIENT)
Dept: INTERNAL MEDICINE CLINIC | Age: 46
End: 2021-02-04
Payer: COMMERCIAL

## 2021-02-04 ENCOUNTER — HOSPITAL ENCOUNTER (OUTPATIENT)
Dept: NON INVASIVE DIAGNOSTICS | Age: 46
Discharge: HOME | End: 2021-02-04
Attending: INTERNAL MEDICINE
Payer: COMMERCIAL

## 2021-02-04 VITALS
OXYGEN SATURATION: 96 % | RESPIRATION RATE: 16 BRPM | TEMPERATURE: 98.2 F | BODY MASS INDEX: 39.56 KG/M2 | HEIGHT: 68 IN | SYSTOLIC BLOOD PRESSURE: 128 MMHG | HEART RATE: 79 BPM | DIASTOLIC BLOOD PRESSURE: 72 MMHG | WEIGHT: 261 LBS

## 2021-02-04 DIAGNOSIS — R73.03 PRE-DIABETES: ICD-10-CM

## 2021-02-04 DIAGNOSIS — Z00.00 WELLNESS EXAMINATION: Primary | ICD-10-CM

## 2021-02-04 DIAGNOSIS — E55.9 VITAMIN D DEFICIENCY: ICD-10-CM

## 2021-02-04 DIAGNOSIS — R00.2 HEART PALPITATIONS: ICD-10-CM

## 2021-02-04 DIAGNOSIS — R06.02 SOB (SHORTNESS OF BREATH) ON EXERTION: ICD-10-CM

## 2021-02-04 DIAGNOSIS — Z00.00 WELLNESS EXAMINATION: ICD-10-CM

## 2021-02-04 DIAGNOSIS — E66.9 OBESITY (BMI 30-39.9): ICD-10-CM

## 2021-02-04 DIAGNOSIS — Z12.11 ENCOUNTER FOR SCREENING COLONOSCOPY: ICD-10-CM

## 2021-02-04 LAB
ATRIAL RATE: 66 BPM
CALCULATED P AXIS, ECG09: 40 DEGREES
CALCULATED R AXIS, ECG10: -8 DEGREES
CALCULATED T AXIS, ECG11: 39 DEGREES
DIAGNOSIS, 93000: NORMAL
P-R INTERVAL, ECG05: 170 MS
Q-T INTERVAL, ECG07: 420 MS
QRS DURATION, ECG06: 88 MS
QTC CALCULATION (BEZET), ECG08: 440 MS
VENTRICULAR RATE, ECG03: 66 BPM

## 2021-02-04 PROCEDURE — 99214 OFFICE O/P EST MOD 30 MIN: CPT | Performed by: INTERNAL MEDICINE

## 2021-02-04 PROCEDURE — 93005 ELECTROCARDIOGRAM TRACING: CPT

## 2021-02-04 NOTE — PROGRESS NOTES
Health Maintenance Due   Topic Date Due    PAP AKA CERVICAL CYTOLOGY  06/19/2017    A1C test (Diabetic or Prediabetic)  09/19/2019    Flu Vaccine (1) 09/01/2020       Chief Complaint   Patient presents with    Complete Physical       1. Have you been to the ER, urgent care clinic since your last visit? Hospitalized since your last visit? Yes 9/20/20, Went to [facility] , Cough, SOB    2. Have you seen or consulted any other health care providers outside of the [address] [place] [address] since your last visit? Include any pap smears or colon screening. No    3) Do you have an Advance Directive on file? no    4) Are you interested in receiving information on Advance Directives? NO      Patient is accompanied by self I have received verbal consent from Gigi Fernandez to discuss any/all medical information while they are present in the room.

## 2021-02-04 NOTE — PROGRESS NOTES
HISTORY OF PRESENT ILLNESS  Dandy Lobo is a 39 y.o. female. ROSE aRtliff is a 39 y.o. female who presents today with c/o SOB noted after climbing the stairs. Patient able to do all other daily activities including working out. Patient reports she had Covid in September of 2020 and noted that after discharge from the hospital all other symptoms she experienced went away except this SOB. Pt saw a pulmonologist via virtual visit and he prescribed her an inhaler to use and patient did not  at pharmacy as it wasn't available. Pt reports no other symptoms except what she describes as a flutter which is relieved by rest.   Pt denies any chest pain, nausea, calf pain, or cough. Pt did not receive the flu shot. PMH includes abdominal hysterectomy related to fibroids,  Anemia,and pre-diabetes. Visit Vitals  /72 (BP 1 Location: Left upper arm, BP Patient Position: Sitting, BP Cuff Size: Adult)   Pulse 79   Temp 98.2 °F (36.8 °C) (Oral)   Resp 16   Ht 5' 8\" (1.727 m)   Wt 261 lb (118.4 kg)   LMP 11/16/2016   SpO2 96%   BMI 39.68 kg/m²     Past Medical History:   Diagnosis Date    Anemia     past, but not since hysterectomy     Past Surgical History:   Procedure Laterality Date    HX HYSTERECTOMY  11/16/2016    FL ABDOMEN SURGERY PROC UNLISTED      tummytuck     Family History   Problem Relation Age of Onset    No Known Problems Mother     No Known Problems Father     No Known Problems Sister     No Known Problems Brother     Heart Failure Maternal Grandmother     No Known Problems Maternal Grandfather     No Known Problems Sister     No Known Problems Brother      Outpatient Encounter Medications as of 2/4/2021   Medication Sig Dispense Refill    MULTIVITAMINS WITH FLUORIDE (MULTI-VITAMIN PO) Take  by mouth. No facility-administered encounter medications on file as of 2/4/2021. Review of Systems   Constitutional: Negative. HENT: Negative. Eyes: Negative. Respiratory: Negative. Cardiovascular: Positive for palpitations. Negative for chest pain, orthopnea, claudication and leg swelling. Sporadic episodes which subside with rest   Gastrointestinal: Negative. Genitourinary: Negative. Musculoskeletal: Negative. Skin: Negative. Neurological: Negative for dizziness, focal weakness, weakness and headaches. Psychiatric/Behavioral: Negative. Physical Exam  Vitals signs and nursing note reviewed. Constitutional:       Appearance: Normal appearance. HENT:      Head: Normocephalic. Right Ear: Tympanic membrane and ear canal normal.      Left Ear: Tympanic membrane and ear canal normal.      Nose: Nose normal.      Mouth/Throat:      Mouth: Mucous membranes are moist.      Pharynx: Oropharynx is clear. Eyes:      Extraocular Movements: Extraocular movements intact. Conjunctiva/sclera: Conjunctivae normal.      Pupils: Pupils are equal, round, and reactive to light. Neck:      Musculoskeletal: Normal range of motion. Cardiovascular:      Rate and Rhythm: Normal rate and regular rhythm. Pulses: Normal pulses. Heart sounds: Normal heart sounds. Pulmonary:      Effort: Pulmonary effort is normal. No respiratory distress. Breath sounds: No wheezing. Chest:      Chest wall: No tenderness. Abdominal:      General: Bowel sounds are normal.      Palpations: Abdomen is soft. Musculoskeletal: Normal range of motion. General: No swelling or tenderness. Right lower leg: No edema. Left lower leg: No edema. Skin:     General: Skin is warm and dry. Neurological:      General: No focal deficit present. Mental Status: She is alert and oriented to person, place, and time. Psychiatric:         Mood and Affect: Mood normal.         Behavior: Behavior normal.         Thought Content:  Thought content normal.         Judgment: Judgment normal.         ASSESSMENT and PLAN  Diagnoses and all orders for this visit:    1. Wellness examination  -     CBC WITH AUTOMATED DIFF; Future  -     METABOLIC PANEL, COMPREHENSIVE; Future  -     TSH 3RD GENERATION; Future    2. SOB (shortness of breath) on exertion  -     REFERRAL TO CARDIOLOGY    3. Obesity (BMI 30-39.9)  -     LIPID PANEL; Future    4. Heart palpitations  -     EKG, 12 LEAD, INITIAL; Future    5. Vitamin D deficiency  -     VITAMIN D, 25 HYDROXY; Future    6. Encounter for screening colonoscopy  -     REFERRAL TO GASTROENTEROLOGY    7. Pre-diabetes  -     HEMOGLOBIN A1C WITH EAG; Future        Patient given a cardiology referral as well as orders to have an EKG done prior to cardiology visit. Patient to follow up sooner or go to ER should symptoms become worse or she experiences chest pain, nausea, sweating or difficulty breathing.          Pinky Reyes student  Ida Smoker, NP  lab results and schedule of future lab studies reviewed with patient

## 2021-02-05 DIAGNOSIS — E55.9 VITAMIN D DEFICIENCY, UNSPECIFIED: Primary | ICD-10-CM

## 2021-02-05 LAB
25(OH)D3+25(OH)D2 SERPL-MCNC: 14 NG/ML (ref 30–100)
ALBUMIN SERPL-MCNC: 4.5 G/DL (ref 3.8–4.8)
ALBUMIN/GLOB SERPL: 1.7 {RATIO} (ref 1.2–2.2)
ALP SERPL-CCNC: 83 IU/L (ref 39–117)
ALT SERPL-CCNC: 21 IU/L (ref 0–32)
AST SERPL-CCNC: 31 IU/L (ref 0–40)
BASOPHILS # BLD AUTO: 0.1 X10E3/UL (ref 0–0.2)
BASOPHILS NFR BLD AUTO: 1 %
BILIRUB SERPL-MCNC: 0.6 MG/DL (ref 0–1.2)
BUN SERPL-MCNC: 9 MG/DL (ref 6–24)
BUN/CREAT SERPL: 11 (ref 9–23)
CALCIUM SERPL-MCNC: 9.4 MG/DL (ref 8.7–10.2)
CHLORIDE SERPL-SCNC: 103 MMOL/L (ref 96–106)
CHOLEST SERPL-MCNC: 204 MG/DL (ref 100–199)
CO2 SERPL-SCNC: 23 MMOL/L (ref 20–29)
CREAT SERPL-MCNC: 0.85 MG/DL (ref 0.57–1)
EOSINOPHIL # BLD AUTO: 0.1 X10E3/UL (ref 0–0.4)
EOSINOPHIL NFR BLD AUTO: 2 %
ERYTHROCYTE [DISTWIDTH] IN BLOOD BY AUTOMATED COUNT: 12.8 % (ref 11.7–15.4)
EST. AVERAGE GLUCOSE BLD GHB EST-MCNC: 131 MG/DL
GLOBULIN SER CALC-MCNC: 2.7 G/DL (ref 1.5–4.5)
GLUCOSE SERPL-MCNC: 111 MG/DL (ref 65–99)
HBA1C MFR BLD: 6.2 % (ref 4.8–5.6)
HCT VFR BLD AUTO: 37.7 % (ref 34–46.6)
HDLC SERPL-MCNC: 49 MG/DL
HGB BLD-MCNC: 12.5 G/DL (ref 11.1–15.9)
IMM GRANULOCYTES # BLD AUTO: 0 X10E3/UL (ref 0–0.1)
IMM GRANULOCYTES NFR BLD AUTO: 0 %
INTERPRETATION, 910389: NORMAL
LDLC SERPL CALC-MCNC: 141 MG/DL (ref 0–99)
LYMPHOCYTES # BLD AUTO: 1.8 X10E3/UL (ref 0.7–3.1)
LYMPHOCYTES NFR BLD AUTO: 38 %
MCH RBC QN AUTO: 28.7 PG (ref 26.6–33)
MCHC RBC AUTO-ENTMCNC: 33.2 G/DL (ref 31.5–35.7)
MCV RBC AUTO: 87 FL (ref 79–97)
MONOCYTES # BLD AUTO: 0.5 X10E3/UL (ref 0.1–0.9)
MONOCYTES NFR BLD AUTO: 11 %
NEUTROPHILS # BLD AUTO: 2.2 X10E3/UL (ref 1.4–7)
NEUTROPHILS NFR BLD AUTO: 48 %
PLATELET # BLD AUTO: 260 X10E3/UL (ref 150–450)
POTASSIUM SERPL-SCNC: 4.1 MMOL/L (ref 3.5–5.2)
PROT SERPL-MCNC: 7.2 G/DL (ref 6–8.5)
RBC # BLD AUTO: 4.35 X10E6/UL (ref 3.77–5.28)
SODIUM SERPL-SCNC: 141 MMOL/L (ref 134–144)
TRIGL SERPL-MCNC: 77 MG/DL (ref 0–149)
TSH SERPL DL<=0.005 MIU/L-ACNC: 2.62 UIU/ML (ref 0.45–4.5)
VLDLC SERPL CALC-MCNC: 14 MG/DL (ref 5–40)
WBC # BLD AUTO: 4.7 X10E3/UL (ref 3.4–10.8)

## 2021-02-05 RX ORDER — MELATONIN
1000 DAILY
Qty: 90 TAB | Refills: 1 | Status: SHIPPED | OUTPATIENT
Start: 2021-02-05

## 2021-03-08 ENCOUNTER — HOSPITAL ENCOUNTER (OUTPATIENT)
Dept: MAMMOGRAPHY | Age: 46
Discharge: HOME | End: 2021-03-08
Attending: INTERNAL MEDICINE

## 2021-03-08 DIAGNOSIS — Z12.31 VISIT FOR SCREENING MAMMOGRAM: ICD-10-CM

## 2021-12-31 LAB — SARS-COV-2, NAA: NOT DETECTED

## 2022-01-05 ENCOUNTER — HOSPITAL ENCOUNTER (OUTPATIENT)
Dept: MAMMOGRAPHY | Age: 47
Discharge: HOME | End: 2022-01-05
Attending: INTERNAL MEDICINE
Payer: COMMERCIAL

## 2022-01-05 PROCEDURE — 77063 BREAST TOMOSYNTHESIS BI: CPT

## 2022-03-19 PROBLEM — B34.9 VIRAL INFECTION: Status: ACTIVE | Noted: 2020-09-15

## 2022-03-19 PROBLEM — U07.1 INFECTION DUE TO SEVERE ACUTE RESPIRATORY SYNDROME CORONAVIRUS 2 (SARS-COV-2): Status: ACTIVE | Noted: 2020-09-15

## 2022-03-19 PROBLEM — J40 BRONCHITIS: Status: ACTIVE | Noted: 2020-09-15

## 2022-03-19 PROBLEM — R06.03 RESPIRATORY DISTRESS: Status: ACTIVE | Noted: 2020-09-15

## 2022-03-20 PROBLEM — Z20.822 SUSPECTED SEVERE ACUTE RESPIRATORY SYNDROME CORONAVIRUS 2 (SARS-COV-2) INFECTION: Status: ACTIVE | Noted: 2020-09-15

## 2022-05-07 NOTE — PROGRESS NOTES
[facility] Medicine  Progress Note    Patient Name: Leanna García  MRN: [id number]  Patient Class: IP- Inpatient   Admission Date: 5/4/2022  Length of Stay: 2 days  Attending Physician: Usman Stacy MD  Primary Care Provider: Stefano Lopez MD        Subjective:     Principal Problem:Bacteremia        HPI:  Ms. García is a 56-year-old female with a past medical history of ESRD on HD Monday Wednesday Friday, hypertension, IDDM2, PAF, and chronic cough who presents today with complaints of fever up to 103. It is severe.  It is associated with cough, posttussive vomiting, fatigue, sinus congestion, weakness, and a left foot ulcer.  She denies chest pain, diarrhea, dizziness, loss of consciousness.  In the ED she was noted to have a large left foot ulceration at the plantar surface and on the side of the foot.  CT of abdomen pelvis also reveals possible enteritis and bilateral perinephric fat stranding.  WBCs 21 K, troponin is 0.4 in the setting of ESRD.  She did not go to dialysis today she felt too bad.  Glucose is 446, anion gap 20, bicarb 24.       Overview/Hospital Course:  05/05  Had HD today  MRI r/o osteomyelitis  Afebrile now    05/06  Had febrile episodes overnight  Blood cultures send to [facility] in [facility]  Had dialysis today  Awaiting WBC scan       Interval History:     Review of Systems   Constitutional:  Negative for activity change and appetite change.   HENT:  Negative for congestion and dental problem.    Eyes:  Negative for discharge and itching.   Respiratory:  Negative for shortness of breath.    Cardiovascular:  Negative for chest pain.   Gastrointestinal:  Negative for abdominal distention and abdominal pain.   Endocrine: Negative for cold intolerance.   Genitourinary:  Negative for difficulty urinating and dysuria.   Musculoskeletal:  Negative for arthralgias and back pain.   Skin:  Negative for color change.   Neurological:  Negative for dizziness and facial asymmetry.  Stable.   Hematological:  Negative for adenopathy.   Psychiatric/Behavioral:  Negative for agitation and behavioral problems.    Objective:     Vital Signs (Most Recent):  Temp: 100 °F (37.8 °C) (05/06/22 1448)  Pulse: 85 (05/06/22 1448)  Resp: 18 (05/06/22 1651)  BP: (!) 99/48 (05/06/22 1448)  SpO2: 95 % (05/06/22 1448)   Vital Signs (24h Range):  Temp:  [97.5 °F (36.4 °C)-103.1 °F (39.5 °C)] 100 °F (37.8 °C)  Pulse:  [53-90] 85  Resp:  [17-20] 18  SpO2:  [95 %-96 %] 95 %  BP: ()/(39-77) 99/48     Weight: 87 kg (191 lb 12.8 oz)  Body mass index is 30.04 kg/m².    Intake/Output Summary (Last 24 hours) at 5/6/2022 2006  Last data filed at 5/6/2022 1420  Gross per 24 hour   Intake 500 ml   Output 720 ml   Net -220 ml      Physical Exam  Vitals and nursing note reviewed.   Constitutional:       General: She is not in acute distress.  HENT:      Head: Atraumatic.      Right Ear: External ear normal.      Left Ear: External ear normal.      Nose: Nose normal.      Mouth/Throat:      Mouth: Mucous membranes are moist.   Eyes:      General: No scleral icterus.  Cardiovascular:      Rate and Rhythm: Normal rate.   Pulmonary:      Effort: Pulmonary effort is normal.   Abdominal:      General: There is no distension.   Musculoskeletal:         General: Normal range of motion.      Cervical back: Normal range of motion.   Skin:     General: Skin is warm.      Comments: Foot ulcer   Neurological:      Mental Status: She is alert and oriented to person, place, and time.   Psychiatric:         Behavior: Behavior normal.       Significant Labs: All pertinent labs within the past 24 hours have been reviewed.  CBC:   Recent Labs   Lab 05/04/22 2040 05/05/22  0320 05/06/22  0414   WBC 21.06* 18.99* 18.00*   HGB 9.2* 9.0* 7.8*   HCT 28.5* 27.4* 24.6*    276 313     CMP:   Recent Labs   Lab 05/04/22 2040 05/05/22 0320 05/06/22  0414   * 131* 135*   K 3.2* 3.4* 4.0   CL 84* 84* 94*   CO2 24 25 28   * 321* 171*   BUN  51* 51* 33*   CREATININE 9.9* 11.0* 7.6*   CALCIUM 8.8 9.1 9.2   PROT 8.0  --   --    ALBUMIN 3.5  --   --    BILITOT 0.9  --   --    ALKPHOS 60  --   --    AST 13  --   --    ALT 11  --   --    ANIONGAP 20* 22* 13   EGFRNONAA 3.9* 3.5* 5.4*       Significant Imaging: I have reviewed all pertinent imaging results/findings within the past 24 hours.      Assessment/Plan:      * Bacteremia  Gram positive rods on BC X 1 ?Bacillus cereus   Had enteritis/Food poisoning which has been resolved(ate chinese food before onset of symptoms)  Blood culture send to [facility] for Microbial Identification system based on MALDI-TOF mass Spectrometry  Repeat Blood cultures ordered       Sepsis  As above       Diabetic ulcer of left midfoot  Wound care  Need Follow up with Podiatry MD on outpatient basis  MRI r/o osteomyelitis  Awaiting WBC scan results    Diabetic foot infection  As above       Foot infection  As above       Cough  Symptomatic management      Acute bronchitis  Symptomatic management      Elevated troponin  Likely demand ischemia in the setting of ESRD        Hypokalemia  Stable     Enteritis  Resolved     Type 2 diabetes mellitus with kidney complication, with long-term current use of insulin  Maintain present regime       ESRD (end stage renal disease)  HD sessions as scheduled       VTE Risk Mitigation (From admission, onward)         Ordered     IP VTE HIGH RISK PATIENT  Once         05/05/22 0003     Place sequential compression device  Until discontinued         05/05/22 0003                Discharge Planning   ERI: 5/9/2022     Code Status: Full Code   Is the patient medically ready for discharge?: No    Reason for patient still in hospital (select all that apply): Treatment  Discharge Plan A: Home with family                  Usman Stacy MD  Department of Hospital Medicine   [facility]

## 2023-03-10 ENCOUNTER — TRANSCRIBE ORDER (OUTPATIENT)
Dept: SCHEDULING | Age: 48
End: 2023-03-10

## 2023-03-10 DIAGNOSIS — Z12.31 VISIT FOR SCREENING MAMMOGRAM: Primary | ICD-10-CM

## 2023-03-13 ENCOUNTER — OFFICE VISIT (OUTPATIENT)
Dept: URGENT CARE | Age: 48
End: 2023-03-13
Payer: COMMERCIAL

## 2023-03-13 VITALS
DIASTOLIC BLOOD PRESSURE: 94 MMHG | TEMPERATURE: 98.4 F | RESPIRATION RATE: 18 BRPM | OXYGEN SATURATION: 99 % | SYSTOLIC BLOOD PRESSURE: 128 MMHG | HEART RATE: 67 BPM

## 2023-03-13 DIAGNOSIS — J01.00 ACUTE NON-RECURRENT MAXILLARY SINUSITIS: Primary | ICD-10-CM

## 2023-03-13 PROCEDURE — 99203 OFFICE O/P NEW LOW 30 MIN: CPT | Performed by: NURSE PRACTITIONER

## 2023-03-13 RX ORDER — DOXYCYCLINE 100 MG/1
100 CAPSULE ORAL 2 TIMES DAILY
Qty: 14 CAPSULE | Refills: 0 | Status: SHIPPED | OUTPATIENT
Start: 2023-03-13 | End: 2023-03-20

## 2023-04-19 ENCOUNTER — HOSPITAL ENCOUNTER (OUTPATIENT)
Dept: MAMMOGRAPHY | Age: 48
Discharge: HOME | End: 2023-04-19
Attending: INTERNAL MEDICINE
Payer: COMMERCIAL

## 2023-04-19 DIAGNOSIS — Z12.31 VISIT FOR SCREENING MAMMOGRAM: ICD-10-CM

## 2023-04-19 PROCEDURE — 77063 BREAST TOMOSYNTHESIS BI: CPT

## 2023-04-24 DIAGNOSIS — Z12.31 VISIT FOR SCREENING MAMMOGRAM: Primary | ICD-10-CM

## 2023-04-27 ENCOUNTER — OFFICE VISIT (OUTPATIENT)
Dept: INTERNAL MEDICINE CLINIC | Age: 48
End: 2023-04-27
Payer: COMMERCIAL

## 2023-04-27 VITALS
BODY MASS INDEX: 38.06 KG/M2 | HEART RATE: 88 BPM | WEIGHT: 251.1 LBS | DIASTOLIC BLOOD PRESSURE: 98 MMHG | OXYGEN SATURATION: 98 % | TEMPERATURE: 96.9 F | HEIGHT: 68 IN | SYSTOLIC BLOOD PRESSURE: 140 MMHG | RESPIRATION RATE: 16 BRPM

## 2023-04-27 DIAGNOSIS — E55.9 VITAMIN D DEFICIENCY: ICD-10-CM

## 2023-04-27 DIAGNOSIS — R73.03 PREDIABETES: ICD-10-CM

## 2023-04-27 DIAGNOSIS — Z12.4 SCREENING FOR CERVICAL CANCER: ICD-10-CM

## 2023-04-27 DIAGNOSIS — Z12.11 SCREEN FOR COLON CANCER: ICD-10-CM

## 2023-04-27 DIAGNOSIS — R03.0 ELEVATED BP WITHOUT DIAGNOSIS OF HYPERTENSION: ICD-10-CM

## 2023-04-27 DIAGNOSIS — Z00.00 ROUTINE GENERAL MEDICAL EXAMINATION AT A HEALTH CARE FACILITY: Primary | ICD-10-CM

## 2023-04-27 PROCEDURE — 99396 PREV VISIT EST AGE 40-64: CPT | Performed by: INTERNAL MEDICINE

## 2023-04-27 NOTE — PROGRESS NOTES
HISTORY OF PRESENT ILLNESS  Fabio Sahu is a 52 y.o. female here for complete physical.  I am seeing her after long time. She gained weight now she has lost 40 pound weight. Watching diet and exercise. Noticed to have  elevated blood pressure. Never had hypertension, denies chest pain palpitation or shortness of breath. Colonoscopy done couple of years back, did not show any polyp. Mammogram done, normal.  Would like to have Pap smear. She is active, working full-time. Have 3 children. All are doing well. Annual Exam    Obesity    Anemia      Review of Systems   Constitutional: Negative. HENT: Negative. Eyes: Negative. Respiratory: Negative. Cardiovascular: Negative. Gastrointestinal: Negative. Genitourinary: Negative. Musculoskeletal: Negative. Skin: Negative. Neurological: Negative. Psychiatric/Behavioral: Negative. Physical Exam  Constitutional:       Appearance: She is obese. HENT:      Head: Normocephalic and atraumatic. Right Ear: Tympanic membrane normal.      Left Ear: Tympanic membrane normal.      Nose: Nose normal.      Mouth/Throat:      Mouth: Mucous membranes are moist.   Eyes:      Extraocular Movements: Extraocular movements intact. Conjunctiva/sclera: Conjunctivae normal.      Pupils: Pupils are equal, round, and reactive to light. Cardiovascular:      Rate and Rhythm: Normal rate and regular rhythm. Pulses: Normal pulses. Heart sounds: Normal heart sounds. Pulmonary:      Effort: Pulmonary effort is normal.      Breath sounds: Normal breath sounds. Abdominal:      General: Abdomen is flat. Bowel sounds are normal.      Palpations: Abdomen is soft. Genitourinary:     General: Normal vulva. Vagina: No vaginal discharge. Comments: Pap smear obtained. Musculoskeletal:         General: Normal range of motion. Cervical back: Normal range of motion. Skin:     General: Skin is warm.    Neurological: General: No focal deficit present. Mental Status: She is alert and oriented to person, place, and time. Mental status is at baseline. Psychiatric:         Mood and Affect: Mood normal.         Behavior: Behavior normal.         Thought Content: Thought content normal.       ASSESSMENT and PLAN  Diagnoses and all orders for this visit:    1. Routine general medical examination at a health care facility    Seems healthy. Aadvised to eat healthy, exercise and try to lose weight. We will check,  -     CBC WITH AUTOMATED DIFF  -     METABOLIC PANEL, COMPREHENSIVE  -     TSH 3RD GENERATION  -     LIPID PANEL  -     URINALYSIS W/ REFLEX CULTURE    2. Vitamin D deficiency    We will check,  -     VITAMIN D, 25 HYDROXY    3. Prediabetes    Last A1c checked several years back, was 6.1. Advised to be an 1800-calorie ADA diet and exercise. She has lost a 40 pound weight. Will check,  -     HEMOGLOBIN A1C WITH EAG    4. Screening for cervical cancer    Pap smear obtained. Will order,  -     PAP IG, APTIMA HPV AND RFX 16/18,45 ([id number])    5. Screen for colon cancer  Colonoscopy done couple of years back, did not show any polyp. 6. Elevated BP without diagnosis of hypertension  Elevated diastolic blood pressure. My Recommendations  -Purchase a blood pressure cuff that goes around your upper arm and check blood pressure at least 3 times per week. Write down your numbers for review. Check blood pressure in the morning and evening. Rest for 5 minutes with feet elevated and arm resting on a table (at mid-chest level) when checking blood pressure  -Exercise 30-60 minutes most days of the week, preferably with a mix of cardiovascular and strength training.    Exercise can improve blood pressure, even if you do not lose weight!  -Limit alcohol intake to less than 2-3 drinks daily   -Avoid tobacco products  -Avoid caffeine, energy drinks, stimulants  -DASH diet - includes fruits, vegetables, fiber, and less than 2000 mg sodium (salt) daily. Try to eat less than 3810-9581 calories daily. Limit fat intake.    -Avoid non-steroidal inflammatory medications (NSAIDS) such as ibuprofen, advil, motrin, aleve, and naproxyn as these may increase blood pressure if used long-term  -Avoid OTC decongestants such as pseudopherine, phenylephrine, Afrin     Discussed expected course/resolution/complications of diagnosis in detail with patient. Medication risks/benefits/costs/interactions/alternatives discussed with patient. Discussed COVID-19 infection precaution with patient. Pt was given an after visit summary which includes diagnoses, current medications & vitals. Pt expressed understanding with the diagnosis and plan.

## 2023-04-27 NOTE — PROGRESS NOTES
Nursing staff confirmed patient with full name and . Prepared patient for visit today by obtaining vitals, verifying medication list and allergies, and briefly discussing reason for visit. Chief Complaint   Patient presents with    Annual Exam    Obesity    Anemia       Current Outpatient Medications   Medication Sig    MULTIVITAMINS WITH FLUORIDE (MULTI-VITAMIN PO) Take  by mouth. No current facility-administered medications for this visit. 1. \"Have you been to the ER, urgent care clinic since your last visit? Hospitalized since your last visit? \" No    2. \"Have you seen or consulted any other health care providers outside of the [address] [place] [address] since your last visit? \" No     3. For patients aged 39-70: Has the patient had a colonoscopy / FIT/ Cologuard? Yes       If the patient is female:    4. For patients aged 41-77: Has the patient had a mammogram within the past 2 years? NA - based on age or sex      11. For patients aged 21-65: Has the patient had a pap smear?  Yes - no Care Gap present Done in-office today

## 2023-04-28 LAB
25(OH)D3+25(OH)D2 SERPL-MCNC: 24.6 NG/ML (ref 30–100)
ALBUMIN SERPL-MCNC: 4.6 G/DL (ref 3.8–4.8)
ALBUMIN/GLOB SERPL: 1.6 {RATIO} (ref 1.2–2.2)
ALP SERPL-CCNC: 68 IU/L (ref 44–121)
ALT SERPL-CCNC: 17 IU/L (ref 0–32)
APPEARANCE UR: CLEAR
AST SERPL-CCNC: 20 IU/L (ref 0–40)
BACTERIA #/AREA URNS HPF: NORMAL /[HPF]
BASOPHILS # BLD AUTO: 0.1 X10E3/UL (ref 0–0.2)
BASOPHILS NFR BLD AUTO: 1 %
BILIRUB SERPL-MCNC: 0.7 MG/DL (ref 0–1.2)
BILIRUB UR QL STRIP: NEGATIVE
BUN SERPL-MCNC: 18 MG/DL (ref 6–24)
BUN/CREAT SERPL: 19 (ref 9–23)
CALCIUM SERPL-MCNC: 9.9 MG/DL (ref 8.7–10.2)
CASTS URNS QL MICRO: NORMAL /LPF
CHLORIDE SERPL-SCNC: 102 MMOL/L (ref 96–106)
CHOLEST SERPL-MCNC: 232 MG/DL (ref 100–199)
CO2 SERPL-SCNC: 22 MMOL/L (ref 20–29)
COLOR UR: YELLOW
CREAT SERPL-MCNC: 0.95 MG/DL (ref 0.57–1)
EGFRCR SERPLBLD CKD-EPI 2021: 74 ML/MIN/1.73
EOSINOPHIL # BLD AUTO: 0 X10E3/UL (ref 0–0.4)
EOSINOPHIL NFR BLD AUTO: 1 %
EPI CELLS #/AREA URNS HPF: NORMAL /HPF (ref 0–10)
ERYTHROCYTE [DISTWIDTH] IN BLOOD BY AUTOMATED COUNT: 12.6 % (ref 11.7–15.4)
EST. AVERAGE GLUCOSE BLD GHB EST-MCNC: 123 MG/DL
GLOBULIN SER CALC-MCNC: 2.8 G/DL (ref 1.5–4.5)
GLUCOSE SERPL-MCNC: 93 MG/DL (ref 70–99)
GLUCOSE UR QL STRIP: NEGATIVE
HBA1C MFR BLD: 5.9 % (ref 4.8–5.6)
HCT VFR BLD AUTO: 37.1 % (ref 34–46.6)
HDLC SERPL-MCNC: 59 MG/DL
HGB BLD-MCNC: 12.7 G/DL (ref 11.1–15.9)
HGB UR QL STRIP: NEGATIVE
IMM GRANULOCYTES # BLD AUTO: 0 X10E3/UL (ref 0–0.1)
IMM GRANULOCYTES NFR BLD AUTO: 1 %
IMP & REVIEW OF LAB RESULTS: NORMAL
KETONES UR QL STRIP: NEGATIVE
LDLC SERPL CALC-MCNC: 166 MG/DL (ref 0–99)
LEUKOCYTE ESTERASE UR QL STRIP: NEGATIVE
LYMPHOCYTES # BLD AUTO: 2 X10E3/UL (ref 0.7–3.1)
LYMPHOCYTES NFR BLD AUTO: 54 %
MCH RBC QN AUTO: 29.4 PG (ref 26.6–33)
MCHC RBC AUTO-ENTMCNC: 34.2 G/DL (ref 31.5–35.7)
MCV RBC AUTO: 86 FL (ref 79–97)
MICRO URNS: NORMAL
MICRO URNS: NORMAL
MONOCYTES # BLD AUTO: 0.3 X10E3/UL (ref 0.1–0.9)
MONOCYTES NFR BLD AUTO: 7 %
NEUTROPHILS # BLD AUTO: 1.3 X10E3/UL (ref 1.4–7)
NEUTROPHILS NFR BLD AUTO: 36 %
NITRITE UR QL STRIP: NEGATIVE
PH UR STRIP: 6.5 [PH] (ref 5–7.5)
PLATELET # BLD AUTO: 271 X10E3/UL (ref 150–450)
POTASSIUM SERPL-SCNC: 4.2 MMOL/L (ref 3.5–5.2)
PROT SERPL-MCNC: 7.4 G/DL (ref 6–8.5)
PROT UR QL STRIP: NEGATIVE
RBC # BLD AUTO: 4.32 X10E6/UL (ref 3.77–5.28)
RBC #/AREA URNS HPF: NORMAL /HPF (ref 0–2)
SODIUM SERPL-SCNC: 139 MMOL/L (ref 134–144)
SP GR UR STRIP: 1.02 (ref 1–1.03)
TRIGL SERPL-MCNC: 45 MG/DL (ref 0–149)
TSH SERPL DL<=0.005 MIU/L-ACNC: 1.24 UIU/ML (ref 0.45–4.5)
URINALYSIS REFLEX, 377202: NORMAL
UROBILINOGEN UR STRIP-MCNC: 0.2 MG/DL (ref 0.2–1)
VLDLC SERPL CALC-MCNC: 7 MG/DL (ref 5–40)
WBC # BLD AUTO: 3.7 X10E3/UL (ref 3.4–10.8)
WBC #/AREA URNS HPF: NORMAL /HPF (ref 0–5)

## 2023-05-04 LAB
CYTOLOGIST CVX/VAG CYTO: ABNORMAL
CYTOLOGY CVX/VAG DOC CYTO: ABNORMAL
CYTOLOGY CVX/VAG DOC THIN PREP: ABNORMAL
DX ICD CODE: ABNORMAL
DX ICD CODE: ABNORMAL
HPV GENOTYPE REFLEX: ABNORMAL
HPV I/H RISK 4 DNA CVX QL PROBE+SIG AMP: NEGATIVE
Lab: ABNORMAL
Lab: ABNORMAL
OTHER STN SPEC: ABNORMAL
PATHOLOGIST CVX/VAG CYTO: ABNORMAL
STAT OF ADQ CVX/VAG CYTO-IMP: ABNORMAL

## 2024-05-01 ENCOUNTER — OFFICE VISIT (OUTPATIENT)
Age: 49
End: 2024-05-01

## 2024-05-01 VITALS
HEIGHT: 68 IN | BODY MASS INDEX: 41.98 KG/M2 | WEIGHT: 277 LBS | TEMPERATURE: 97 F | RESPIRATION RATE: 16 BRPM | OXYGEN SATURATION: 98 % | HEART RATE: 88 BPM | DIASTOLIC BLOOD PRESSURE: 108 MMHG | SYSTOLIC BLOOD PRESSURE: 164 MMHG

## 2024-05-01 DIAGNOSIS — E66.01 OBESITY, CLASS III, BMI 40-49.9 (MORBID OBESITY) (HCC): ICD-10-CM

## 2024-05-01 DIAGNOSIS — Z12.31 ENCOUNTER FOR SCREENING MAMMOGRAM FOR MALIGNANT NEOPLASM OF BREAST: ICD-10-CM

## 2024-05-01 DIAGNOSIS — R73.03 PREDIABETES: ICD-10-CM

## 2024-05-01 DIAGNOSIS — I10 PRIMARY HYPERTENSION: ICD-10-CM

## 2024-05-01 DIAGNOSIS — R06.83 SNORING: ICD-10-CM

## 2024-05-01 DIAGNOSIS — E55.9 VITAMIN D DEFICIENCY: ICD-10-CM

## 2024-05-01 DIAGNOSIS — R87.619 ASCUS (ATYPICAL SQUAMOUS CELLS OF UNDETERMINED SIGNIFICANCE) ON GYNECOLOGIC PAPANICOLAOU SMEAR COMPLICATING PREGNANCY, ANTEPARTUM: ICD-10-CM

## 2024-05-01 DIAGNOSIS — Z00.00 ENCOUNTER FOR GENERAL ADULT MEDICAL EXAMINATION WITHOUT ABNORMAL FINDINGS: Primary | ICD-10-CM

## 2024-05-01 DIAGNOSIS — O28.2 ASCUS (ATYPICAL SQUAMOUS CELLS OF UNDETERMINED SIGNIFICANCE) ON GYNECOLOGIC PAPANICOLAOU SMEAR COMPLICATING PREGNANCY, ANTEPARTUM: ICD-10-CM

## 2024-05-01 LAB
APPEARANCE UR: CLEAR
BACTERIA #/AREA URNS HPF: NORMAL /[HPF]
BASOPHILS # BLD AUTO: 0 X10E3/UL (ref 0–0.2)
BASOPHILS NFR BLD AUTO: 1 %
BILIRUB UR QL STRIP: NEGATIVE
CASTS URNS QL MICRO: NORMAL /LPF
COLOR UR: YELLOW
EOSINOPHIL # BLD AUTO: 0.1 X10E3/UL (ref 0–0.4)
EOSINOPHIL NFR BLD AUTO: 1 %
EPI CELLS #/AREA URNS HPF: NORMAL /HPF (ref 0–10)
ERYTHROCYTE [DISTWIDTH] IN BLOOD BY AUTOMATED COUNT: 12.4 % (ref 11.7–15.4)
GLUCOSE UR QL STRIP: NEGATIVE
HBA1C MFR BLD: 6.2 % (ref 4.8–5.6)
HCT VFR BLD AUTO: 39 % (ref 34–46.6)
HGB BLD-MCNC: 12.6 G/DL (ref 11.1–15.9)
HGB UR QL STRIP: NEGATIVE
IMM GRANULOCYTES # BLD AUTO: 0 X10E3/UL (ref 0–0.1)
IMM GRANULOCYTES NFR BLD AUTO: 0 %
KETONES UR QL STRIP: NEGATIVE
LEUKOCYTE ESTERASE UR QL STRIP: NEGATIVE
LYMPHOCYTES # BLD AUTO: 1.9 X10E3/UL (ref 0.7–3.1)
LYMPHOCYTES NFR BLD AUTO: 51 %
MCH RBC QN AUTO: 28.1 PG (ref 26.6–33)
MCHC RBC AUTO-ENTMCNC: 32.3 G/DL (ref 31.5–35.7)
MCV RBC AUTO: 87 FL (ref 79–97)
MICRO URNS: NORMAL
MICRO URNS: NORMAL
MONOCYTES # BLD AUTO: 0.3 X10E3/UL (ref 0.1–0.9)
MONOCYTES NFR BLD AUTO: 8 %
NEUTROPHILS # BLD AUTO: 1.5 X10E3/UL (ref 1.4–7)
NEUTROPHILS NFR BLD AUTO: 39 %
NITRITE UR QL STRIP: NEGATIVE
PH UR STRIP: 6 [PH] (ref 5–7.5)
PLATELET # BLD AUTO: 258 X10E3/UL (ref 150–450)
PROT UR QL STRIP: NEGATIVE
RBC # BLD AUTO: 4.48 X10E6/UL (ref 3.77–5.28)
RBC #/AREA URNS HPF: NORMAL /HPF (ref 0–2)
SP GR UR STRIP: 1.02 (ref 1–1.03)
UROBILINOGEN UR STRIP-MCNC: 0.2 MG/DL (ref 0.2–1)
WBC # BLD AUTO: 3.8 X10E3/UL (ref 3.4–10.8)
WBC #/AREA URNS HPF: NORMAL /HPF (ref 0–5)

## 2024-05-01 RX ORDER — VALSARTAN AND HYDROCHLOROTHIAZIDE 160; 12.5 MG/1; MG/1
1 TABLET, FILM COATED ORAL DAILY
Qty: 90 TABLET | Refills: 1 | Status: SHIPPED | OUTPATIENT
Start: 2024-05-01

## 2024-05-01 SDOH — ECONOMIC STABILITY: HOUSING INSECURITY
IN THE LAST 12 MONTHS, WAS THERE A TIME WHEN YOU DID NOT HAVE A STEADY PLACE TO SLEEP OR SLEPT IN A SHELTER (INCLUDING NOW)?: NO

## 2024-05-01 SDOH — ECONOMIC STABILITY: FOOD INSECURITY: WITHIN THE PAST 12 MONTHS, THE FOOD YOU BOUGHT JUST DIDN'T LAST AND YOU DIDN'T HAVE MONEY TO GET MORE.: NEVER TRUE

## 2024-05-01 SDOH — ECONOMIC STABILITY: FOOD INSECURITY: WITHIN THE PAST 12 MONTHS, YOU WORRIED THAT YOUR FOOD WOULD RUN OUT BEFORE YOU GOT MONEY TO BUY MORE.: NEVER TRUE

## 2024-05-01 SDOH — ECONOMIC STABILITY: INCOME INSECURITY: HOW HARD IS IT FOR YOU TO PAY FOR THE VERY BASICS LIKE FOOD, HOUSING, MEDICAL CARE, AND HEATING?: NOT HARD AT ALL

## 2024-05-01 ASSESSMENT — PATIENT HEALTH QUESTIONNAIRE - PHQ9
SUM OF ALL RESPONSES TO PHQ QUESTIONS 1-9: 0
1. LITTLE INTEREST OR PLEASURE IN DOING THINGS: NOT AT ALL
SUM OF ALL RESPONSES TO PHQ9 QUESTIONS 1 & 2: 0
2. FEELING DOWN, DEPRESSED OR HOPELESS: NOT AT ALL

## 2024-05-01 ASSESSMENT — ENCOUNTER SYMPTOMS
RESPIRATORY NEGATIVE: 1
EYES NEGATIVE: 1
GASTROINTESTINAL NEGATIVE: 1

## 2024-05-01 NOTE — PROGRESS NOTES
\"Have you been to the ER, urgent care clinic since your last visit?  Hospitalized since your last visit?\"    NO    “Have you seen or consulted any other health care providers outside of [facility] since your last visit?”    NO    Chief Complaint   Patient presents with    Annual Exam    Weight Management    Anemia    Elevated Blood Pressure                 Click Here for Release of Records Request  
Plan  1. Hypertension.  The patient's hypertension is likely a result of stress, weight gain, and elevated blood pressure. A prescription for valsartan/hydrochlorothiazide 160/12.5 mg has been issued. The patient has been advised to monitor her blood pressure at home, with a target blood pressure of 130/80 or below. She has been advised to adhere to the DASH diet. Should her blood pressure consistently fall below 130/80, she has been instructed to inform us.    2. Weight gain.  The patient's weight gain could potentially be attributed to sleep apnea. The patient has been advised to lose 20 pounds, aiming for a weight of 250 pounds. She has been advised to monitor her carbohydrate intake, increase her protein intake, and increase her vegetable intake. Additionally, she has been advised to exercise for at least 30 minutes, 5 days a week. A prescription for Wegovy 0.5 mg has been issued. The patient has been informed about the potential side effects, risks, and benefits.    3. ASCUS.  The patient's last Pap smear showed abnormal atypical cells of undetermined significance. A referral to Dr. Galarza, a gynecologist, has been made.    4. Snoring.  The patient's snoring could potentially be attributed to sleep apnea. . A sleep study has been ordered.    5. Health maintenance.  A comprehensive blood work, including CBC, CMP, thyroid function test, cholesterol, A1c, and urine test, has been ordered. A mammogram has been ordered.    Follow-up  The patient is scheduled for a follow-up visit in 6 to 8 weeks.      The patient (or guardian, if applicable) and other individuals in attendance with the patient were advised that Artificial Intelligence will be utilized during this visit to record and process the conversation to generate a clinical note. The patient (or guardian, if applicable) and other individuals in attendance at the appointment consented to the use of AI, including the recording.

## 2024-05-02 ENCOUNTER — TRANSCRIBE ORDERS (OUTPATIENT)
Facility: HOSPITAL | Age: 49
End: 2024-05-02

## 2024-05-02 DIAGNOSIS — Z12.31 SCREENING MAMMOGRAM FOR BREAST CANCER: Primary | ICD-10-CM

## 2024-05-02 LAB
25(OH)D3+25(OH)D2 SERPL-MCNC: 20.9 NG/ML (ref 30–100)
ALBUMIN SERPL-MCNC: 4.5 G/DL (ref 3.9–4.9)
ALBUMIN/GLOB SERPL: 1.6 {RATIO} (ref 1.2–2.2)
ALP SERPL-CCNC: 77 IU/L (ref 44–121)
ALT SERPL-CCNC: 14 IU/L (ref 0–32)
AST SERPL-CCNC: 17 IU/L (ref 0–40)
BILIRUB SERPL-MCNC: 0.6 MG/DL (ref 0–1.2)
BUN SERPL-MCNC: 12 MG/DL (ref 6–24)
BUN/CREAT SERPL: 14 (ref 9–23)
CALCIUM SERPL-MCNC: 9.4 MG/DL (ref 8.7–10.2)
CHLORIDE SERPL-SCNC: 103 MMOL/L (ref 96–106)
CHOLEST SERPL-MCNC: 218 MG/DL (ref 100–199)
CO2 SERPL-SCNC: 19 MMOL/L (ref 20–29)
CREAT SERPL-MCNC: 0.88 MG/DL (ref 0.57–1)
EGFRCR SERPLBLD CKD-EPI 2021: 81 ML/MIN/1.73
GLOBULIN SER CALC-MCNC: 2.8 G/DL (ref 1.5–4.5)
GLUCOSE SERPL-MCNC: 106 MG/DL (ref 70–99)
HDLC SERPL-MCNC: 54 MG/DL
IMP & REVIEW OF LAB RESULTS: NORMAL
LDLC SERPL CALC-MCNC: 152 MG/DL (ref 0–99)
POTASSIUM SERPL-SCNC: 4 MMOL/L (ref 3.5–5.2)
PROT SERPL-MCNC: 7.3 G/DL (ref 6–8.5)
SODIUM SERPL-SCNC: 138 MMOL/L (ref 134–144)
TRIGL SERPL-MCNC: 66 MG/DL (ref 0–149)
TSH SERPL DL<=0.005 MIU/L-ACNC: 1.75 UIU/ML (ref 0.45–4.5)
VLDLC SERPL CALC-MCNC: 12 MG/DL (ref 5–40)

## 2024-05-06 DIAGNOSIS — R73.03 PRE-DIABETES: Primary | ICD-10-CM

## 2024-05-06 RX ORDER — METFORMIN HYDROCHLORIDE 500 MG/1
500 TABLET, EXTENDED RELEASE ORAL
Qty: 90 TABLET | Refills: 1 | Status: SHIPPED | OUTPATIENT
Start: 2024-05-06

## 2024-05-06 NOTE — TELEPHONE ENCOUNTER
----- Message from Josey Vargas MD sent at 5/2/2024  8:41 AM EDT -----  Low vitamin D, advised to take vitamin D3 2000 units once a day for 4 months.  Prediabetic, A1c is getting worse.  She should be on metformin  mg p.o. at bedtime with food.  Will get repeat BMP in a month.  LDL slightly improved but still high, be on Mediterranean diet and exercise.  Follow-up in 4 months.

## 2024-05-16 ENCOUNTER — HOSPITAL ENCOUNTER (OUTPATIENT)
Facility: HOSPITAL | Age: 49
Discharge: HOME | End: 2024-05-16
Attending: INTERNAL MEDICINE
Payer: COMMERCIAL

## 2024-05-16 VITALS — BODY MASS INDEX: 41.97 KG/M2 | WEIGHT: 276.9 LBS | HEIGHT: 68 IN

## 2024-05-16 DIAGNOSIS — Z12.31 SCREENING MAMMOGRAM FOR BREAST CANCER: ICD-10-CM

## 2024-05-16 PROCEDURE — 77063 BREAST TOMOSYNTHESIS BI: CPT

## 2024-05-28 DIAGNOSIS — E66.01 OBESITY, MORBID (HCC): Primary | ICD-10-CM

## 2024-05-28 RX ORDER — SEMAGLUTIDE 1 MG/.5ML
1 INJECTION, SOLUTION SUBCUTANEOUS
Qty: 2 ML | Refills: 3 | Status: SHIPPED | OUTPATIENT
Start: 2024-05-28

## 2024-05-28 NOTE — TELEPHONE ENCOUNTER
Josey Vargas MD  You2 minutes ago (9:23 AM)       Please increase her Wegovy to 1 mg subcutaneous weekly.   Last appt 5/1/2024      Next Apt:   6/17/2024  1:00 PM Josey Vargas MD [facility] BS AMB         [id number] [address] -  [phone] - F [phone] [address] [facility] [address]  Phone: [phone] Fax: [phone]

## 2024-06-06 DIAGNOSIS — R73.03 PRE-DIABETES: ICD-10-CM

## 2024-06-06 NOTE — PROGRESS NOTES
Annual Exam    Chief Complaint   Patient presents with    New Patient    Annual Exam       Nelson Hebert is a 48 y.o.  presenting for annual exam. Her main concerns today include well woman exam    Her last pap test was in 4/27/23- ASCUS/HPV neg. Pap test was performed by Dr. Vargas.  Her last mammogram was in 5/2/24- BiRads 1.  Her last colonoscopy was at age 45. She is likely due next year.    She underwent a total abdominal hysterectomy in 2019 due to fibroids and HMB.    She does notice chronic back and chest pain due to weight of pendulous breasts.    She has previously had an abdominoplasty.     Past Medical History:   Diagnosis Date    Abnormal Pap smear of cervix     Anemia     past, but not since hysterectomy    Fibroid, uterine     Herpes        Past Surgical History:   Procedure Laterality Date    HYSTERECTOMY (CERVIX STATUS UNKNOWN)  11/16/2016    UT UNLISTED PROCEDURE ABDOMEN PERITONEUM & OMENTUM      tummytuck       Family History   Problem Relation Age of Onset    No Known Problems Mother     No Known Problems Father     Ovarian Cancer Sister     Cancer Sister     No Known Problems Brother     Heart Failure Maternal Grandmother     No Known Problems Maternal Grandfather     No Known Problems Sister     No Known Problems Brother        Social History     Socioeconomic History    Marital status:      Spouse name: Not on file    Number of children: Not on file    Years of education: Not on file    Highest education level: Not on file   Occupational History    Not on file   Tobacco Use    Smoking status: Never    Smokeless tobacco: Never   Vaping Use    Vaping Use: Never used   Substance and Sexual Activity    Alcohol use: Yes    Drug use: No    Sexual activity: Yes     Partners: Male     Birth control/protection: Surgical     Comment:  has 3 children ages 11 yrs, 9rs. & 6 yrs.   Other Topics Concern    Not on file   Social History Narrative    Not on file     Social Determinants of Health

## 2024-06-07 ENCOUNTER — OFFICE VISIT (OUTPATIENT)
Age: 49
End: 2024-06-07
Payer: COMMERCIAL

## 2024-06-07 VITALS
WEIGHT: 274 LBS | HEIGHT: 68 IN | SYSTOLIC BLOOD PRESSURE: 126 MMHG | DIASTOLIC BLOOD PRESSURE: 80 MMHG | BODY MASS INDEX: 41.52 KG/M2

## 2024-06-07 DIAGNOSIS — Z01.419 ENCOUNTER FOR GYNECOLOGICAL EXAMINATION WITHOUT ABNORMAL FINDING: Primary | ICD-10-CM

## 2024-06-07 PROCEDURE — 99386 PREV VISIT NEW AGE 40-64: CPT | Performed by: OBSTETRICS & GYNECOLOGY

## 2024-06-07 NOTE — PROGRESS NOTES
Chief Complaint   Patient presents with    New Patient    Annual Exam       Ob/Gyn Hx:  A1  LMP - No LMP recorded. Patient has had a hysterectomy.  Menses - hyst   Contraception - status post hysterectomy.  Hx of STI - Yes, herpes  SA - Yes, male partner.    Health maintenance:  Last Pap: 23- ASCUS/HPV neg.   Last Mammogram: 2024- Bi-Rads 1  Last Bone Density: N/A  Last colonoscopy: around 45. Due next year.    1. Have you been to the ER, urgent care clinic, or hospitalized since your last visit? This is the patients first visit with the practice.     2. Have you seen or consulted any other health care providers outside of the [facility] since your last visit?  This is the patients first visit with the practice.     She declines  a chaperone during the gynecologic exam today.      Shantel Hankins MA

## 2024-06-11 ENCOUNTER — TELEMEDICINE (OUTPATIENT)
Age: 49
End: 2024-06-11
Payer: COMMERCIAL

## 2024-06-11 DIAGNOSIS — I10 PRIMARY HYPERTENSION: ICD-10-CM

## 2024-06-11 DIAGNOSIS — R73.03 PRE-DIABETES: Primary | ICD-10-CM

## 2024-06-11 PROCEDURE — 99213 OFFICE O/P EST LOW 20 MIN: CPT | Performed by: INTERNAL MEDICINE

## 2024-06-11 RX ORDER — VALSARTAN AND HYDROCHLOROTHIAZIDE 160; 12.5 MG/1; MG/1
1 TABLET, FILM COATED ORAL DAILY
Qty: 90 TABLET | Refills: 1 | Status: SHIPPED | OUTPATIENT
Start: 2024-06-11

## 2024-06-11 RX ORDER — CLINDAMYCIN HYDROCHLORIDE 300 MG/1
300 CAPSULE ORAL 3 TIMES DAILY
COMMUNITY
Start: 2024-06-10

## 2024-06-11 ASSESSMENT — ENCOUNTER SYMPTOMS
GASTROINTESTINAL NEGATIVE: 1
EYES NEGATIVE: 1
RESPIRATORY NEGATIVE: 1

## 2024-07-29 ENCOUNTER — PATIENT MESSAGE (OUTPATIENT)
Age: 49
End: 2024-07-29

## 2024-07-29 DIAGNOSIS — E66.01 OBESITY, MORBID (HCC): Primary | ICD-10-CM

## 2024-07-29 NOTE — TELEPHONE ENCOUNTER
From: Nelson Hebert  To: Dr. Josey Vargas  Sent: 7/29/2024 2:31 PM EDT  Subject: Wegovy 1mg    Candi Vargas & Nurse Tom,    The Wegovy 1mg is not working for me. To date, I have lost 8-9 pounds and have been stagnant for a month and a half. I am paying out of pocket $650 per month. Can I try Ozempic or something else; then maybe I won’t have to take the Metformin?

## 2024-07-30 ENCOUNTER — OFFICE VISIT (OUTPATIENT)
Age: 49
End: 2024-07-30
Payer: COMMERCIAL

## 2024-07-30 VITALS
DIASTOLIC BLOOD PRESSURE: 94 MMHG | BODY MASS INDEX: 41.07 KG/M2 | WEIGHT: 271 LBS | HEART RATE: 76 BPM | SYSTOLIC BLOOD PRESSURE: 155 MMHG | TEMPERATURE: 98 F | OXYGEN SATURATION: 95 % | HEIGHT: 68 IN

## 2024-07-30 DIAGNOSIS — R73.03 PRE-DIABETES: ICD-10-CM

## 2024-07-30 DIAGNOSIS — G47.33 OBSTRUCTIVE SLEEP APNEA (ADULT) (PEDIATRIC): Primary | ICD-10-CM

## 2024-07-30 DIAGNOSIS — I10 PRIMARY HYPERTENSION: ICD-10-CM

## 2024-07-30 PROCEDURE — 3077F SYST BP >= 140 MM HG: CPT | Performed by: INTERNAL MEDICINE

## 2024-07-30 PROCEDURE — 3080F DIAST BP >= 90 MM HG: CPT | Performed by: INTERNAL MEDICINE

## 2024-07-30 PROCEDURE — 99244 OFF/OP CNSLTJ NEW/EST MOD 40: CPT | Performed by: INTERNAL MEDICINE

## 2024-07-30 ASSESSMENT — SLEEP AND FATIGUE QUESTIONNAIRES
HOW LIKELY ARE YOU TO NOD OFF OR FALL ASLEEP WHILE SITTING QUIETLY AFTER LUNCH WITHOUT ALCOHOL: WOULD NEVER DOZE
HOW LIKELY ARE YOU TO NOD OFF OR FALL ASLEEP WHEN YOU ARE A PASSENGER IN A CAR FOR AN HOUR WITHOUT A BREAK: WOULD NEVER DOZE
HOW LIKELY ARE YOU TO NOD OFF OR FALL ASLEEP WHILE WATCHING TV: MODERATE CHANCE OF DOZING
ESS TOTAL SCORE: 4
HOW LIKELY ARE YOU TO NOD OFF OR FALL ASLEEP WHILE SITTING AND READING: SLIGHT CHANCE OF DOZING
HOW LIKELY ARE YOU TO NOD OFF OR FALL ASLEEP WHILE SITTING AND TALKING TO SOMEONE: WOULD NEVER DOZE
HOW LIKELY ARE YOU TO NOD OFF OR FALL ASLEEP WHILE LYING DOWN TO REST IN THE AFTERNOON WHEN CIRCUMSTANCES PERMIT: SLIGHT CHANCE OF DOZING
HOW LIKELY ARE YOU TO NOD OFF OR FALL ASLEEP IN A CAR, WHILE STOPPED FOR A FEW MINUTES IN TRAFFIC: WOULD NEVER DOZE

## 2024-07-30 NOTE — PROGRESS NOTES
Semaglutide-Weight Management (WEGOVY) 1 MG/0.5ML SOAJ SC injection, Inject 1 mg into the skin every 7 days, Disp: 2 mL, Rfl: 3    metFORMIN (GLUCOPHAGE-XR) 500 MG extended release tablet, Take 1 tablet by mouth daily (with breakfast), Disp: 90 tablet, Rfl: 1     She  has a past medical history of Abnormal Pap smear of cervix, Anemia, Fibroid, uterine, and Herpes.    She  has a past surgical history that includes Hysterectomy (11/16/2016) and pr unlisted procedure abdomen peritoneum & omentum.    She family history includes Cancer in her sister; Heart Failure in her maternal grandmother; No Known Problems in her brother, brother, father, maternal grandfather, mother, and sister; Ovarian Cancer in her sister.    She  reports that she has never smoked. She has never used smokeless tobacco. She reports current alcohol use. She reports that she does not use drugs.     Review of Systems:  Constitutional: + weight gain.  Eyes:  No blurred vision.  CVS:  No significant chest pain  Pulm:  No significant shortness of breath  GI:  No significant nausea or vomiting  :  No significant nocturia  Musculoskeletal:  No significant joint pain at night  Skin:  No significant rashes  Neuro:  No significant dizziness   Psych:  No active mood issues    Sleep Review of Systems: notable for some difficulty falling asleep; +frequent awakenings at night;  regular dreaming noted; no nightmares ;+early morning headaches; no memory problems; no concentration issue       Objective:   BP (!) 152/91 (Site: Left Upper Arm, Position: Sitting, Cuff Size: Large Adult)   Pulse 76   Temp 98 °F (36.7 °C)   Ht 1.727 m (5' 8\")   Wt 122.9 kg (271 lb)   SpO2 95%   BMI 41.21 kg/m²       General:   Not in acute distress   Eyes:  Anicteric sclerae, no obvious strabismus   Nose:  No obvious nasal septum deviation    Oropharynx:   Class 3 oropharyngeal outlet, thick tongue base , low-lying soft palate, narrow tonsilo-pharyngeal pilars   Tonsils:

## 2024-07-30 NOTE — PATIENT INSTRUCTIONS
be needed to remove enlarged tissues in the throat.  Follow-up care is a key part of your treatment and safety. Be sure to make and go to all appointments, and call your doctor if you are having problems. It's also a good idea to know your test results and keep a list of the medicines you take.  How can you care for yourself at home?  Lose weight, if needed. It may reduce the number of times you stop breathing or have slowed breathing.  Go to bed at the same time every night.  Sleep on your side. It may stop mild apnea. If you tend to roll onto your back, sew a pocket in the back of your [employer] top. Put a tennis ball into the pocket, and stitch the pocket shut. This will help keep you from sleeping on your back.  Avoid alcohol and medicines such as sleeping pills and sedatives before bed.  Do not smoke. Smoking can make sleep apnea worse. If you need help quitting, talk to your doctor about stop-smoking programs and medicines. These can increase your chances of quitting for good.  Prop up the head of your bed 4 to 6 inches by putting bricks under the legs of the bed.  Treat breathing problems, such as a stuffy nose, caused by a cold or allergies.  Use a continuous positive airway pressure (CPAP) breathing machine if lifestyle changes do not help your apnea and your doctor recommends it. The machine keeps your airway from closing when you sleep.  If CPAP does not help you, ask your doctor whether you should try other breathing machines. A bilevel positive airway pressure machine has two types of air pressureâ€”one for breathing in and one for breathing out. Another device raises or lowers air pressure as needed while you breathe.  If your nose feels dry or bleeds when using one of these machines, talk with your doctor about increasing moisture in the air. A humidifier may help.  If your nose is runny or stuffy from using a breathing machine, talk with your doctor about using decongestants or a corticosteroid nasal

## 2024-07-31 RX ORDER — SEMAGLUTIDE 1.7 MG/.75ML
1.7 INJECTION, SOLUTION SUBCUTANEOUS
Qty: 3 ML | Refills: 2 | Status: SHIPPED | OUTPATIENT
Start: 2024-07-31

## 2024-08-07 ENCOUNTER — TELEPHONE (OUTPATIENT)
Age: 49
End: 2024-08-07

## 2024-08-07 DIAGNOSIS — G47.33 OBSTRUCTIVE SLEEP APNEA (ADULT) (PEDIATRIC): Primary | ICD-10-CM

## 2024-08-07 NOTE — TELEPHONE ENCOUNTER
In lab Split Sleep Test not meeting initial criteria for approval. Requesting order for Home Sleep Test.

## 2024-08-10 DIAGNOSIS — R73.03 PRE-DIABETES: ICD-10-CM

## 2024-08-13 RX ORDER — METFORMIN HYDROCHLORIDE 500 MG/1
500 TABLET, EXTENDED RELEASE ORAL
Qty: 90 TABLET | Refills: 1 | Status: SHIPPED | OUTPATIENT
Start: 2024-08-13

## 2024-08-21 ENCOUNTER — PATIENT MESSAGE (OUTPATIENT)
Age: 49
End: 2024-08-21

## 2024-08-26 RX ORDER — SEMAGLUTIDE 2.4 MG/.75ML
2.4 INJECTION, SOLUTION SUBCUTANEOUS
Qty: 2 ML | Refills: 2 | Status: SHIPPED | OUTPATIENT
Start: 2024-08-26

## 2024-08-26 NOTE — TELEPHONE ENCOUNTER
Last appt 6/11/2024      Next Apt:     Future Appointments   Date Time Provider Department Center   9/11/2024  9:45 AM Josey Vargas MD [facility] BS ECC DEP   11/4/2024  9:45 AM Josey Vargas MD [facility] DEP         [id number] [address] [facility] -  [phone] - F [phone] [address] [facility] [address]  Phone: [phone] Fax: [phone]

## 2024-09-04 ENCOUNTER — CLINICAL DOCUMENTATION (OUTPATIENT)
Age: 49
End: 2024-09-04

## 2024-09-04 DIAGNOSIS — G47.33 OBSTRUCTIVE SLEEP APNEA (ADULT) (PEDIATRIC): Primary | ICD-10-CM

## 2024-09-04 NOTE — PROGRESS NOTES
HSAT in r-drive.    Tech to convey results to patient          Your test showed mild sleep apnea.    Apnea/hypopnea index was 7/hour. Very mild (lowest 90%) oxygen desaturations. Some snoring noted.     Along with weight loss, treatment options include      CPAP would take care of all apneas and snoring regardless of position of sleep. If she  is agreeable to use this at night when sleeping , I will order it  and see her after she  has used it a month or so. CPAP is the gold standard for treatment of sleep apnea.   Mouthpiece/dental device-can reduce apneas but they do not work well if sleeping on back so the positioning device is needed with this option in addition to the mouthpiece. Generally, snoring will be reduced but not eliminated.   Avoiding all sleeping on back. I would recommend she  do this with assistance of a positioning device which looks like a big fannypack that is worn to bed to keep off back position (Slumber bump or sleep noodle). These can be purchased online or can be made by taking a fannypack and stuffing it with 2-3 tennis balls.      She had mentioned she wanted to try PAP if test positive. I have attached an order

## 2024-09-05 ENCOUNTER — CLINICAL DOCUMENTATION (OUTPATIENT)
Age: 49
End: 2024-09-05

## 2024-09-09 ENCOUNTER — TELEPHONE (OUTPATIENT)
Facility: HOSPITAL | Age: 49
End: 2024-09-09

## 2024-09-11 ENCOUNTER — OFFICE VISIT (OUTPATIENT)
Age: 49
End: 2024-09-11
Payer: COMMERCIAL

## 2024-09-11 VITALS
OXYGEN SATURATION: 98 % | RESPIRATION RATE: 16 BRPM | BODY MASS INDEX: 40.62 KG/M2 | HEART RATE: 95 BPM | TEMPERATURE: 98 F | WEIGHT: 268 LBS | HEIGHT: 68 IN | SYSTOLIC BLOOD PRESSURE: 130 MMHG | DIASTOLIC BLOOD PRESSURE: 72 MMHG

## 2024-09-11 DIAGNOSIS — R73.03 PRE-DIABETES: ICD-10-CM

## 2024-09-11 DIAGNOSIS — Z12.11 SCREEN FOR COLON CANCER: ICD-10-CM

## 2024-09-11 DIAGNOSIS — E55.9 VITAMIN D DEFICIENCY: ICD-10-CM

## 2024-09-11 DIAGNOSIS — I10 PRIMARY HYPERTENSION: Primary | ICD-10-CM

## 2024-09-11 DIAGNOSIS — E66.01 OBESITY, CLASS III, BMI 40-49.9 (MORBID OBESITY) (HCC): ICD-10-CM

## 2024-09-11 DIAGNOSIS — E78.5 ELEVATED LIPIDS: ICD-10-CM

## 2024-09-11 PROCEDURE — 3075F SYST BP GE 130 - 139MM HG: CPT | Performed by: INTERNAL MEDICINE

## 2024-09-11 PROCEDURE — 99214 OFFICE O/P EST MOD 30 MIN: CPT | Performed by: INTERNAL MEDICINE

## 2024-09-11 PROCEDURE — 3078F DIAST BP <80 MM HG: CPT | Performed by: INTERNAL MEDICINE

## 2024-09-11 RX ORDER — METFORMIN HCL 500 MG
1000 TABLET, EXTENDED RELEASE 24 HR ORAL
Qty: 180 TABLET | Refills: 1 | Status: SHIPPED | OUTPATIENT
Start: 2024-09-11

## 2024-09-11 RX ORDER — SEMAGLUTIDE 2.4 MG/.75ML
2.4 INJECTION, SOLUTION SUBCUTANEOUS
Qty: 2 ML | Refills: 5 | Status: SHIPPED | OUTPATIENT
Start: 2024-09-11

## 2024-09-11 RX ORDER — VALSARTAN AND HYDROCHLOROTHIAZIDE 160; 12.5 MG/1; MG/1
1 TABLET, FILM COATED ORAL DAILY
Qty: 90 TABLET | Refills: 1 | Status: SHIPPED | OUTPATIENT
Start: 2024-09-11

## 2024-09-11 ASSESSMENT — ENCOUNTER SYMPTOMS
GASTROINTESTINAL NEGATIVE: 1
EYES NEGATIVE: 1
RESPIRATORY NEGATIVE: 1

## 2024-09-12 LAB
25(OH)D3+25(OH)D2 SERPL-MCNC: 31.7 NG/ML (ref 30–100)
ALBUMIN SERPL-MCNC: 4.2 G/DL (ref 3.9–4.9)
ALP SERPL-CCNC: 75 IU/L (ref 44–121)
ALT SERPL-CCNC: 13 IU/L (ref 0–32)
AST SERPL-CCNC: 15 IU/L (ref 0–40)
BILIRUB SERPL-MCNC: 0.8 MG/DL (ref 0–1.2)
BUN SERPL-MCNC: 11 MG/DL (ref 6–24)
BUN/CREAT SERPL: 11 (ref 9–23)
CALCIUM SERPL-MCNC: 10 MG/DL (ref 8.7–10.2)
CHLORIDE SERPL-SCNC: 99 MMOL/L (ref 96–106)
CHOLEST SERPL-MCNC: 227 MG/DL (ref 100–199)
CO2 SERPL-SCNC: 24 MMOL/L (ref 20–29)
CREAT SERPL-MCNC: 0.96 MG/DL (ref 0.57–1)
EGFRCR SERPLBLD CKD-EPI 2021: 73 ML/MIN/1.73
GLOBULIN SER CALC-MCNC: 3 G/DL (ref 1.5–4.5)
GLUCOSE SERPL-MCNC: 87 MG/DL (ref 70–99)
HBA1C MFR BLD: 5.5 % (ref 4.8–5.6)
HDLC SERPL-MCNC: 57 MG/DL
IMP & REVIEW OF LAB RESULTS: NORMAL
LDLC SERPL CALC-MCNC: 152 MG/DL (ref 0–99)
POTASSIUM SERPL-SCNC: 4.1 MMOL/L (ref 3.5–5.2)
PROT SERPL-MCNC: 7.2 G/DL (ref 6–8.5)
SODIUM SERPL-SCNC: 136 MMOL/L (ref 134–144)
TRIGL SERPL-MCNC: 103 MG/DL (ref 0–149)
VLDLC SERPL CALC-MCNC: 18 MG/DL (ref 5–40)

## 2024-09-13 ENCOUNTER — PATIENT MESSAGE (OUTPATIENT)
Age: 49
End: 2024-09-13

## 2024-09-13 DIAGNOSIS — G47.33 OBSTRUCTIVE SLEEP APNEA (ADULT) (PEDIATRIC): Primary | ICD-10-CM

## 2024-09-19 ENCOUNTER — CLINICAL DOCUMENTATION (OUTPATIENT)
Age: 49
End: 2024-09-19

## 2025-01-07 ENCOUNTER — TELEMEDICINE (OUTPATIENT)
Age: 50
End: 2025-01-07
Payer: COMMERCIAL

## 2025-01-07 DIAGNOSIS — E66.01 OBESITY, CLASS III, BMI 40-49.9 (MORBID OBESITY): Primary | ICD-10-CM

## 2025-01-07 DIAGNOSIS — I10 PRIMARY HYPERTENSION: ICD-10-CM

## 2025-01-07 DIAGNOSIS — R73.03 PRE-DIABETES: ICD-10-CM

## 2025-01-07 PROCEDURE — 99214 OFFICE O/P EST MOD 30 MIN: CPT | Performed by: INTERNAL MEDICINE

## 2025-01-07 RX ORDER — VALSARTAN AND HYDROCHLOROTHIAZIDE 160; 12.5 MG/1; MG/1
1 TABLET, FILM COATED ORAL DAILY
Qty: 90 TABLET | Refills: 1 | Status: SHIPPED | OUTPATIENT
Start: 2025-01-07

## 2025-01-07 RX ORDER — METFORMIN HYDROCHLORIDE 500 MG/1
1000 TABLET, EXTENDED RELEASE ORAL
Qty: 180 TABLET | Refills: 1 | Status: SHIPPED | OUTPATIENT
Start: 2025-01-07

## 2025-01-07 RX ORDER — TIRZEPATIDE 5 MG/.5ML
INJECTION, SOLUTION SUBCUTANEOUS
Qty: 2 ML | Refills: 1 | Status: SHIPPED | OUTPATIENT
Start: 2025-01-07

## 2025-01-07 ASSESSMENT — PATIENT HEALTH QUESTIONNAIRE - PHQ9
SUM OF ALL RESPONSES TO PHQ QUESTIONS 1-9: 0
SUM OF ALL RESPONSES TO PHQ9 QUESTIONS 1 & 2: 0
SUM OF ALL RESPONSES TO PHQ QUESTIONS 1-9: 0
1. LITTLE INTEREST OR PLEASURE IN DOING THINGS: NOT AT ALL
2. FEELING DOWN, DEPRESSED OR HOPELESS: NOT AT ALL

## 2025-01-07 ASSESSMENT — ENCOUNTER SYMPTOMS
EYES NEGATIVE: 1
RESPIRATORY NEGATIVE: 1
GASTROINTESTINAL NEGATIVE: 1

## 2025-01-07 NOTE — PROGRESS NOTES
Chief Complaint   Patient presents with    Hypertension    Follow-up Chronic Condition    Weight Management     \"Have you been to the ER, urgent care clinic since your last visit?  Hospitalized since your last visit?\"    NO    “Have you seen or consulted any other health care providers outside our system since your last visit?”    NO

## 2025-01-07 NOTE — PROGRESS NOTES
Nelson Hebert, was evaluated through a synchronous (real-time) audio-video encounter. The patient (or guardian if applicable) is aware that this is a billable service, which includes applicable co-pays. This Virtual Visit was conducted with patient's (and/or legal guardian's) consent. Patient identification was verified, and a caregiver was present when appropriate.   The patient was located at Home: [address]   [place] [id number]  Provider was located at Facility (Appt Dept): [address]  [place] [address]  Confirm you are appropriately licensed, registered, or certified to deliver care in the state where the patient is located as indicated above. If you are not or unsure, please re-schedule the visit: Yes, I confirm.     Nelson Hebert (:  1975) is a Established patient, presenting virtually for evaluation of the following:      Below is the assessment and plan developed based on review of pertinent history, physical exam, labs, studies, and medications.     Assessment & Plan  Obesity, Class III, BMI 40-49.9 (morbid obesity)     She has lost approximately 4 to 5 pound with Wegovy.  She believes Wegovy is not helping her and she is paying out-of-pocket she would like to try Zepbound.  Will order,  Orders:    tirzepatide-weight management (ZEPBOUND) 5 MG/0.5ML SOAJ subCUTAneous auto-injector pen; 5 MG sc WEEKLY,dc FARIBA    Primary hypertension  Stable blood pressure.  Will order,    Orders:    valsartan-hydroCHLOROthiazide (DIOVAN-HCT) 160-12.5 MG per tablet; Take 1 tablet by mouth daily    Pre-diabetes  Last A1c normal.  Will continue,    Orders:    metFORMIN (GLUCOPHAGE-XR) 500 MG extended release tablet; Take 2 tablets by mouth daily (with breakfast)       Assessment & Plan         No follow-ups on file.       Subjective   History of Present Illness  Ms. Allen is here for follow-up.  She is morbidly obese, watching diet and exercise.  Has lost only 5 or 6 pound

## 2025-01-07 NOTE — ASSESSMENT & PLAN NOTE
Last A1c normal.  Will continue,    Orders:    metFORMIN (GLUCOPHAGE-XR) 500 MG extended release tablet; Take 2 tablets by mouth daily (with breakfast)

## 2025-02-14 DIAGNOSIS — E66.01 OBESITY, CLASS III, BMI 40-49.9 (MORBID OBESITY): Primary | ICD-10-CM

## 2025-02-14 NOTE — TELEPHONE ENCOUNTER
Last appt 1/7/2025      Next Apt:     Future Appointments   Date Time Provider Department Center   3/12/2025 10:15 AM Josey Vargas MD [facility] BS ECC DEP         [id number] [place], [place] - [address] -  [phone] - F [phone] [address] [facility] [address]  Phone: [phone] Fax: [phone]

## 2025-03-12 ENCOUNTER — OFFICE VISIT (OUTPATIENT)
Age: 50
End: 2025-03-12
Payer: COMMERCIAL

## 2025-03-12 VITALS
SYSTOLIC BLOOD PRESSURE: 130 MMHG | DIASTOLIC BLOOD PRESSURE: 88 MMHG | HEART RATE: 94 BPM | WEIGHT: 262 LBS | RESPIRATION RATE: 16 BRPM | HEIGHT: 68 IN | OXYGEN SATURATION: 98 % | BODY MASS INDEX: 39.71 KG/M2 | TEMPERATURE: 97.9 F

## 2025-03-12 DIAGNOSIS — N62 LARGE BREASTS: Primary | ICD-10-CM

## 2025-03-12 DIAGNOSIS — R73.03 PREDIABETES: ICD-10-CM

## 2025-03-12 DIAGNOSIS — Z12.31 ENCOUNTER FOR SCREENING MAMMOGRAM FOR MALIGNANT NEOPLASM OF BREAST: ICD-10-CM

## 2025-03-12 DIAGNOSIS — E78.5 ELEVATED LIPIDS: ICD-10-CM

## 2025-03-12 DIAGNOSIS — G89.29 CHRONIC BREAST PAIN: ICD-10-CM

## 2025-03-12 DIAGNOSIS — E66.01 OBESITY, CLASS III, BMI 40-49.9 (MORBID OBESITY): ICD-10-CM

## 2025-03-12 DIAGNOSIS — Z12.4 ENCOUNTER FOR SCREENING FOR MALIGNANT NEOPLASM OF CERVIX: ICD-10-CM

## 2025-03-12 DIAGNOSIS — I10 PRIMARY HYPERTENSION: ICD-10-CM

## 2025-03-12 DIAGNOSIS — N64.4 CHRONIC BREAST PAIN: ICD-10-CM

## 2025-03-12 DIAGNOSIS — R73.03 PRE-DIABETES: ICD-10-CM

## 2025-03-12 LAB — HBA1C MFR BLD: 5.7 % (ref 4.8–5.6)

## 2025-03-12 PROCEDURE — 3075F SYST BP GE 130 - 139MM HG: CPT | Performed by: INTERNAL MEDICINE

## 2025-03-12 PROCEDURE — 99214 OFFICE O/P EST MOD 30 MIN: CPT | Performed by: INTERNAL MEDICINE

## 2025-03-12 PROCEDURE — 3079F DIAST BP 80-89 MM HG: CPT | Performed by: INTERNAL MEDICINE

## 2025-03-12 RX ORDER — VALSARTAN AND HYDROCHLOROTHIAZIDE 160; 12.5 MG/1; MG/1
1 TABLET, FILM COATED ORAL DAILY
Qty: 90 TABLET | Refills: 1 | Status: SHIPPED | OUTPATIENT
Start: 2025-03-12

## 2025-03-12 SDOH — ECONOMIC STABILITY: INCOME INSECURITY: IN THE LAST 12 MONTHS, WAS THERE A TIME WHEN YOU WERE NOT ABLE TO PAY THE MORTGAGE OR RENT ON TIME?: NO

## 2025-03-12 SDOH — ECONOMIC STABILITY: FOOD INSECURITY: WITHIN THE PAST 12 MONTHS, THE FOOD YOU BOUGHT JUST DIDN'T LAST AND YOU DIDN'T HAVE MONEY TO GET MORE.: NEVER TRUE

## 2025-03-12 SDOH — ECONOMIC STABILITY: TRANSPORTATION INSECURITY
IN THE PAST 12 MONTHS, HAS LACK OF TRANSPORTATION KEPT YOU FROM MEETINGS, WORK, OR FROM GETTING THINGS NEEDED FOR DAILY LIVING?: NO

## 2025-03-12 SDOH — ECONOMIC STABILITY: TRANSPORTATION INSECURITY
IN THE PAST 12 MONTHS, HAS THE LACK OF TRANSPORTATION KEPT YOU FROM MEDICAL APPOINTMENTS OR FROM GETTING MEDICATIONS?: NO

## 2025-03-12 SDOH — ECONOMIC STABILITY: FOOD INSECURITY: WITHIN THE PAST 12 MONTHS, YOU WORRIED THAT YOUR FOOD WOULD RUN OUT BEFORE YOU GOT MONEY TO BUY MORE.: NEVER TRUE

## 2025-03-12 ASSESSMENT — ENCOUNTER SYMPTOMS
ROS SKIN COMMENTS: BREAST PAIN
RESPIRATORY NEGATIVE: 1
EYES NEGATIVE: 1
GASTROINTESTINAL NEGATIVE: 1

## 2025-03-12 NOTE — PROGRESS NOTES
Chief Complaint   Patient presents with    Weight Management    Follow-up Chronic Condition    Hypertension           History of Present Illness  The patient presents for evaluation of breast pain, weight management, and blood pressure management.    Breast Pain  - She reports experiencing significant discomfort in her chest, particularly when rising after prolonged periods of lying down without a bra.  - This pain is described as a sensation of her entire chest side being on the verge of detachment.  - She also notes a forward pulling sensation, which she attributes to the weight of her breasts.  - Additionally, she mentions the presence of indentations on her shoulders caused by her bra straps.  - She recalls a previous mammogram technician inquiring about breast pain due to the observed heaviness of her breasts.  - She has not communicated these symptoms to her physician.  - She expresses interest in undergoing an MRI mammogram, citing a previous instance 3 years ago where she had to return due to breast density.    Weight Management  - She is currently on a regimen of Zepbound 7.5 mg, which she plans to continue until her return from [facility].  - She reports no adverse effects from the medication.  - She has recently initiated a high-protein, high-vegetable diet, which she believes has contributed to her weight loss of 6 pounds.  - She does not think the medication has worked.  - She is scheduled to travel to [place] for a week, departing on 07/28/2024.  - She is open to incorporating exercise into her routine.    Blood Pressure Management  - She has been on blood pressure medication since 05/01/2024.  - She is on blood pressure medication for life.    Supplemental information: She had a Pap smear done by Dr. Devries and will go back to her in 09/2024.    MEDICATIONS  - Current: Zepbound    Past Medical History:   Diagnosis Date    Abnormal Pap smear of cervix     Anemia     past, but not since hysterectomy    Fibroid,

## 2025-03-12 NOTE — PROGRESS NOTES
Chief Complaint   Patient presents with    Weight Management    Follow-up Chronic Condition    Hypertension     \"Have you been to the ER, urgent care clinic since your last visit?  Hospitalized since your last visit?\"    NO    “Have you seen or consulted any other health care providers outside our system since your last visit?”    NO

## 2025-03-13 LAB
ALBUMIN SERPL-MCNC: 4.4 G/DL (ref 3.9–4.9)
ALP SERPL-CCNC: 64 IU/L (ref 44–121)
ALT SERPL-CCNC: 14 IU/L (ref 0–32)
AST SERPL-CCNC: 19 IU/L (ref 0–40)
BILIRUB SERPL-MCNC: 0.4 MG/DL (ref 0–1.2)
BUN SERPL-MCNC: 14 MG/DL (ref 6–24)
BUN/CREAT SERPL: 15 (ref 9–23)
CALCIUM SERPL-MCNC: 9.8 MG/DL (ref 8.7–10.2)
CHLORIDE SERPL-SCNC: 104 MMOL/L (ref 96–106)
CHOLEST SERPL-MCNC: 205 MG/DL (ref 100–199)
CO2 SERPL-SCNC: 22 MMOL/L (ref 20–29)
CREAT SERPL-MCNC: 0.95 MG/DL (ref 0.57–1)
EGFRCR SERPLBLD CKD-EPI 2021: 73 ML/MIN/1.73
GLOBULIN SER CALC-MCNC: 2.7 G/DL (ref 1.5–4.5)
GLUCOSE SERPL-MCNC: 87 MG/DL (ref 70–99)
HDLC SERPL-MCNC: 46 MG/DL
IMP & REVIEW OF LAB RESULTS: NORMAL
LDLC SERPL CALC-MCNC: 149 MG/DL (ref 0–99)
POTASSIUM SERPL-SCNC: 4 MMOL/L (ref 3.5–5.2)
PROT SERPL-MCNC: 7.1 G/DL (ref 6–8.5)
SODIUM SERPL-SCNC: 142 MMOL/L (ref 134–144)
TRIGL SERPL-MCNC: 57 MG/DL (ref 0–149)
VLDLC SERPL CALC-MCNC: 10 MG/DL (ref 5–40)

## 2025-03-17 ENCOUNTER — RESULTS FOLLOW-UP (OUTPATIENT)
Age: 50
End: 2025-03-17

## 2025-04-15 ENCOUNTER — PATIENT MESSAGE (OUTPATIENT)
Age: 50
End: 2025-04-15

## 2025-04-15 DIAGNOSIS — E66.01 OBESITY, MORBID: Primary | ICD-10-CM

## 2025-04-15 NOTE — TELEPHONE ENCOUNTER
Last appt 3/12/2025      Next Apt:     Future Appointments   Date Time Provider Department Center   7/8/2025 10:45 AM Josey Vargas MD [facility] BS ECC DEP         [id number] [place], [place] - [address] -  [phone] - F [phone] [address] [facility] [address]  Phone: [phone] Fax: [phone]

## 2025-06-02 ENCOUNTER — TRANSCRIBE ORDERS (OUTPATIENT)
Facility: HOSPITAL | Age: 50
End: 2025-06-02

## 2025-06-02 DIAGNOSIS — Z12.31 VISIT FOR SCREENING MAMMOGRAM: Primary | ICD-10-CM

## 2025-06-04 ENCOUNTER — HOSPITAL ENCOUNTER (OUTPATIENT)
Facility: HOSPITAL | Age: 50
Discharge: HOME | End: 2025-06-07
Attending: INTERNAL MEDICINE
Payer: COMMERCIAL

## 2025-06-04 DIAGNOSIS — Z12.31 VISIT FOR SCREENING MAMMOGRAM: ICD-10-CM

## 2025-06-04 PROCEDURE — 77063 BREAST TOMOSYNTHESIS BI: CPT

## 2025-06-05 ENCOUNTER — RESULTS FOLLOW-UP (OUTPATIENT)
Age: 50
End: 2025-06-05

## 2025-06-06 ENCOUNTER — APPOINTMENT (OUTPATIENT)
Facility: HOSPITAL | Age: 50
End: 2025-06-06
Attending: INTERNAL MEDICINE
Payer: COMMERCIAL

## 2025-06-06 ENCOUNTER — HOSPITAL ENCOUNTER (OUTPATIENT)
Facility: HOSPITAL | Age: 50
Discharge: HOME | End: 2025-06-09
Attending: INTERNAL MEDICINE
Payer: COMMERCIAL

## 2025-06-06 ENCOUNTER — HOSPITAL ENCOUNTER (OUTPATIENT)
Facility: HOSPITAL | Age: 50
End: 2025-06-06
Attending: INTERNAL MEDICINE
Payer: COMMERCIAL

## 2025-06-06 ENCOUNTER — RESULTS FOLLOW-UP (OUTPATIENT)
Age: 50
End: 2025-06-06

## 2025-06-06 DIAGNOSIS — R92.8 ABNORMAL MAMMOGRAM OF LEFT BREAST: ICD-10-CM

## 2025-06-06 DIAGNOSIS — N63.42 SUBAREOLAR MASS OF LEFT BREAST: Primary | ICD-10-CM

## 2025-06-06 PROCEDURE — G0279 TOMOSYNTHESIS, MAMMO: HCPCS

## 2025-06-06 PROCEDURE — 76642 ULTRASOUND BREAST LIMITED: CPT

## 2025-06-18 ENCOUNTER — HOSPITAL ENCOUNTER (OUTPATIENT)
Facility: HOSPITAL | Age: 50
Discharge: HOME | End: 2025-06-21
Payer: COMMERCIAL

## 2025-06-18 ENCOUNTER — TELEPHONE (OUTPATIENT)
Facility: HOSPITAL | Age: 50
End: 2025-06-18

## 2025-06-18 DIAGNOSIS — N63.42 SUBAREOLAR MASS OF LEFT BREAST: ICD-10-CM

## 2025-06-18 DIAGNOSIS — R92.8 ABNORMAL MAMMOGRAM OF LEFT BREAST: ICD-10-CM

## 2025-06-18 PROCEDURE — A4648 IMPLANTABLE TISSUE MARKER: HCPCS

## 2025-06-18 PROCEDURE — 6360000002 HC RX W HCPCS: Performed by: RADIOLOGY

## 2025-06-18 PROCEDURE — 77065 DX MAMMO INCL CAD UNI: CPT

## 2025-06-18 PROCEDURE — 88305 TISSUE EXAM BY PATHOLOGIST: CPT

## 2025-06-18 RX ORDER — LIDOCAINE HYDROCHLORIDE AND EPINEPHRINE 10; 10 MG/ML; UG/ML
20 INJECTION, SOLUTION INFILTRATION; PERINEURAL ONCE
Status: COMPLETED | OUTPATIENT
Start: 2025-06-18 | End: 2025-06-18

## 2025-06-18 RX ORDER — LIDOCAINE HYDROCHLORIDE 10 MG/ML
10 INJECTION, SOLUTION EPIDURAL; INFILTRATION; INTRACAUDAL; PERINEURAL ONCE
Status: COMPLETED | OUTPATIENT
Start: 2025-06-18 | End: 2025-06-18

## 2025-06-18 RX ADMIN — LIDOCAINE HYDROCHLORIDE AND EPINEPHRINE 20 ML: 10; 10 INJECTION, SOLUTION INFILTRATION; PERINEURAL at 12:26

## 2025-06-18 RX ADMIN — LIDOCAINE HYDROCHLORIDE 10 ML: 10 INJECTION, SOLUTION EPIDURAL; INFILTRATION; INTRACAUDAL; PERINEURAL at 12:26

## 2025-06-18 NOTE — TELEPHONE ENCOUNTER
Phone call to pt. Inquiring if pt was still coming for procedure - time is 1105 and appt. Time 1100.  Pt states \"I will be there in a few seconds\".

## 2025-06-18 NOTE — PROGRESS NOTES
1220 - pt ambulated to mammo dept. Accomp. By Kaylen for her post left breast biopsy mammogram.  Dressing noted left breast biopsy site clean/dry/intact without bleeding, swelling or pain.      1223 - left breast biopsy sample to gross room via nurse.    1227 - left breast biopsy sample signed into lab log via nurse.    1245 - Ice pack applied to left breast biopsy site with instructions on use and verbal understanding per pt.    1250 - I have reviewed discharge instructions with the patient.  The patient verbalized understanding.  Copy of discharge instructions per AVS copied, reviewed with pt, and copy to pt. Prior to discharge.  Pt stable without c/o pain noted.  Dressing left breast still noted as above charted.  Pt ok'd for discharge per Dr. Monteiro post mammogram.  Pt dressed self and ambulated to waiting room accomp. By nurse for discharge to home via private vehicle driving herself home.

## 2025-06-18 NOTE — DISCHARGE INSTRUCTIONS
[facility]  [address] [place] [address]  [phone]      Breast Biopsy Discharge Instructions      1. After the biopsy, we will place a clean covered ice pack over the biopsy site, within the bra - you should leave the ice pack on 30 minutes and then remove the ice pack for 1-2 hours until bedtime.  If needed you can continue applying ice the following day. It is a good idea to wear your bra for support, both day and night unless this causes you discomfort.     2. You may take Extra-Strength Tylenol (two tablets) every 4 to 6 hours as needed for pain.  Do not take aspirin or aspirin products (e.g. ibuprofen, Advil, Motrin) as these may cause more bleeding.     3. You may expect some bruising and skin discoloration in the biopsy area.  This is normal and generally should resolve in 5 to 7 days.     4. Most women do not find it necessary to restrict their activities after the procedure. You should rest as needed on the day of your biopsy.  The next day, if you are feeling okay, you may resume your regular work/activity schedule.  Avoid strenuous activity and heavy lifting, jogging, aerobics, or vacuuming for 48 hours after the procedure.     5. 48 hours after your biopsy, remove the large outer dressing and leave the steri-strips (tiny pieces of tape) in place.  The steri-strips will usually fall off in a few days.  You may shower 48 hours after your biopsy and you may get the steri-strips wet.  If still present after 4 days, you may gently peel the strips off.  Keep the area clean and dry and shower daily.     6. If you have bleeding from the incision area, hold firm pressure on the area for 20 minutes.  This should control any slight oozing that might occur.  If you develop persistent bleeding or pain which does not respond to the above measures or if you develop a fever, excessive swelling, redness, heat or drainage, please call the Stere[facility] Breast Health Navigator at

## 2025-06-19 NOTE — PROGRESS NOTES
Dr. Moises Monteiro reviewed pathology result and recommended her to continue her yearly mammograms.  The pt. was notified of the benign results and recommendations for a follow up mammogram in 1 year.  Advised pt to follow up with her doctor for any changes. Pt with understanding.

## 2025-07-08 ENCOUNTER — OFFICE VISIT (OUTPATIENT)
Age: 50
End: 2025-07-08
Payer: COMMERCIAL

## 2025-07-08 VITALS
TEMPERATURE: 97.4 F | BODY MASS INDEX: 40.16 KG/M2 | HEART RATE: 78 BPM | WEIGHT: 265 LBS | RESPIRATION RATE: 16 BRPM | SYSTOLIC BLOOD PRESSURE: 136 MMHG | DIASTOLIC BLOOD PRESSURE: 82 MMHG | OXYGEN SATURATION: 98 % | HEIGHT: 68 IN

## 2025-07-08 DIAGNOSIS — I10 PRIMARY HYPERTENSION: ICD-10-CM

## 2025-07-08 DIAGNOSIS — E78.00 PURE HYPERCHOLESTEROLEMIA: ICD-10-CM

## 2025-07-08 DIAGNOSIS — E66.01 OBESITY, MORBID (HCC): Primary | ICD-10-CM

## 2025-07-08 DIAGNOSIS — R73.03 PREDIABETES: ICD-10-CM

## 2025-07-08 PROCEDURE — 3075F SYST BP GE 130 - 139MM HG: CPT | Performed by: INTERNAL MEDICINE

## 2025-07-08 PROCEDURE — 99214 OFFICE O/P EST MOD 30 MIN: CPT | Performed by: INTERNAL MEDICINE

## 2025-07-08 PROCEDURE — 3079F DIAST BP 80-89 MM HG: CPT | Performed by: INTERNAL MEDICINE

## 2025-07-08 ASSESSMENT — ENCOUNTER SYMPTOMS
EYES NEGATIVE: 1
GASTROINTESTINAL NEGATIVE: 1
RESPIRATORY NEGATIVE: 1

## 2025-07-08 NOTE — PROGRESS NOTES
normal.      Breath sounds: Normal breath sounds. No wheezing.   Abdominal:      General: Bowel sounds are normal.      Palpations: Abdomen is soft.      Tenderness: There is no abdominal tenderness.   Musculoskeletal:   Cervical: Neck nontender, Motion okay.  Lower back: Spine nontender, range of motion normal.  Extremities: No edema noticed, dorsalis pedis pulse normal  Skin: No abnormality noted.  Neurological:      Mental Status: Patient  is alert and oriented to person, place, and time.   Alert,  oriented x 3, cranial nerves II to XII grossly intact, motor 5/5 bilaterally, sensory within normal limit.  Psychiatric:         Mood and Affect: Mood and affect normal.     Assessment and plan:            Nelson was seen today for weight management.    Diagnoses and all orders for this visit:    Obesity, morbid (HCC)  -     tirzepatide-weight management 15 MG/0.5ML SOAJ subCUTAneous auto-injector pen; Inject 15 mg into the skin Once a week at 5 PM    Primary hypertension  -     Comprehensive Metabolic Panel; Future    Prediabetes  -     Hemoglobin A1C; Future  -     Comprehensive Metabolic Panel; Future    Pure hypercholesterolemia  -     Lipid Panel; Future        Assessment & Plan  1. Benign left breast mass: Stable. Biopsy confirmed benign. 2 cm.  - No immediate intervention required  - Consultation scheduled for 09/2025 for further reassurance    2. Weight management: Stable. Blood pressure 136/82, pulse 70.  - Continue Zepbound 12.5 mg  - Consider controlled substance diet pill if no improvement after 2 months  - Increase physical activity  - Monitor weight and dietary habits  Addressed weight, diet and exercise with patient. Decrease carbohydrates (white foods, sweet foods, sweet drinks and alcohol), increase green leafy vegetables and protein (lean meats and beans) with each meal. Avoid fried foods. Eat 3-5 small meals daily. Do not skip meals. Increase water intake. Increase physical activity to 30 minutes

## 2025-07-09 LAB
ALBUMIN SERPL-MCNC: 4.5 G/DL (ref 3.9–4.9)
ALP SERPL-CCNC: 71 IU/L (ref 44–121)
ALT SERPL-CCNC: 14 IU/L (ref 0–32)
AST SERPL-CCNC: 16 IU/L (ref 0–40)
BILIRUB SERPL-MCNC: 0.5 MG/DL (ref 0–1.2)
BUN SERPL-MCNC: 13 MG/DL (ref 6–24)
BUN/CREAT SERPL: 14 (ref 9–23)
CALCIUM SERPL-MCNC: 9.4 MG/DL (ref 8.7–10.2)
CHLORIDE SERPL-SCNC: 106 MMOL/L (ref 96–106)
CHOLEST SERPL-MCNC: 204 MG/DL (ref 100–199)
CO2 SERPL-SCNC: 20 MMOL/L (ref 20–29)
CREAT SERPL-MCNC: 0.95 MG/DL (ref 0.57–1)
EGFRCR SERPLBLD CKD-EPI 2021: 73 ML/MIN/1.73
GLOBULIN SER CALC-MCNC: 2.9 G/DL (ref 1.5–4.5)
GLUCOSE SERPL-MCNC: 85 MG/DL (ref 70–99)
HDLC SERPL-MCNC: 55 MG/DL
IMP & REVIEW OF LAB RESULTS: NORMAL
LDLC SERPL CALC-MCNC: 134 MG/DL (ref 0–99)
POTASSIUM SERPL-SCNC: 4.4 MMOL/L (ref 3.5–5.2)
PROT SERPL-MCNC: 7.4 G/DL (ref 6–8.5)
SODIUM SERPL-SCNC: 142 MMOL/L (ref 134–144)
TRIGL SERPL-MCNC: 86 MG/DL (ref 0–149)
VLDLC SERPL CALC-MCNC: 15 MG/DL (ref 5–40)

## 2025-07-11 LAB
EST. AVERAGE GLUCOSE BLD GHB EST-MCNC: 108 MG/DL
HBA1C MFR BLD: 5.4 %HB

## 2025-07-13 DIAGNOSIS — E66.01 OBESITY, MORBID (HCC): ICD-10-CM

## 2025-07-15 RX ORDER — SEMAGLUTIDE 0.25 MG/.5ML
0.25 INJECTION, SOLUTION SUBCUTANEOUS
Qty: 2 ML | Refills: 3 | Status: SHIPPED | OUTPATIENT
Start: 2025-07-15

## 2025-07-16 ENCOUNTER — RESULTS FOLLOW-UP (OUTPATIENT)
Age: 50
End: 2025-07-16

## 2025-07-22 ENCOUNTER — PATIENT MESSAGE (OUTPATIENT)
Age: 50
End: 2025-07-22

## 2025-07-22 DIAGNOSIS — E66.01 OBESITY, MORBID (HCC): Primary | ICD-10-CM

## 2025-07-22 NOTE — TELEPHONE ENCOUNTER
Last appt 7/8/2025      Next Apt:     Future Appointments   Date Time Provider Department Center   11/11/2025 10:45 AM Josey Vargas MD [facility] BS ECC DEP         [id number] [place], [place] - [address] -  [phone] - F [phone] [address] [address]  Phone: [phone] Fax: [phone]

## 2025-07-23 DIAGNOSIS — E66.01 OBESITY, MORBID (HCC): ICD-10-CM

## 2025-07-23 RX ORDER — SEMAGLUTIDE 0.25 MG/.5ML
INJECTION, SOLUTION SUBCUTANEOUS
Refills: 0 | OUTPATIENT
Start: 2025-07-23

## 2025-08-28 DIAGNOSIS — E66.01 OBESITY, MORBID (HCC): ICD-10-CM

## 2025-08-29 DIAGNOSIS — E66.01 OBESITY, MORBID (HCC): ICD-10-CM

## 2025-08-29 RX ORDER — SEMAGLUTIDE 0.25 MG/.5ML
INJECTION, SOLUTION SUBCUTANEOUS
Refills: 0 | OUTPATIENT
Start: 2025-08-29

## 2025-08-30 DIAGNOSIS — E66.01 OBESITY, MORBID (HCC): ICD-10-CM

## 2025-09-02 RX ORDER — SEMAGLUTIDE 0.25 MG/.5ML
INJECTION, SOLUTION SUBCUTANEOUS
Refills: 0 | OUTPATIENT
Start: 2025-09-02